# Patient Record
Sex: FEMALE | Race: WHITE | Employment: FULL TIME | URBAN - METROPOLITAN AREA
[De-identification: names, ages, dates, MRNs, and addresses within clinical notes are randomized per-mention and may not be internally consistent; named-entity substitution may affect disease eponyms.]

---

## 2017-10-12 ENCOUNTER — APPOINTMENT (OUTPATIENT)
Dept: LAB | Facility: HOSPITAL | Age: 57
End: 2017-10-12
Attending: INTERNAL MEDICINE
Payer: COMMERCIAL

## 2017-10-12 DIAGNOSIS — R19.7 DIARRHEA OF PRESUMED INFECTIOUS ORIGIN: ICD-10-CM

## 2017-10-12 PROCEDURE — 87077 CULTURE AEROBIC IDENTIFY: CPT

## 2017-10-12 PROCEDURE — 87046 STOOL CULTR AEROBIC BACT EA: CPT

## 2017-10-12 PROCEDURE — 87045 FECES CULTURE AEROBIC BACT: CPT

## 2020-02-21 ENCOUNTER — WALK IN (OUTPATIENT)
Dept: URGENT CARE | Age: 60
End: 2020-02-21

## 2020-02-21 VITALS
OXYGEN SATURATION: 98 % | HEIGHT: 62 IN | TEMPERATURE: 97.9 F | BODY MASS INDEX: 20.98 KG/M2 | WEIGHT: 114 LBS | DIASTOLIC BLOOD PRESSURE: 68 MMHG | SYSTOLIC BLOOD PRESSURE: 122 MMHG | HEART RATE: 68 BPM

## 2020-02-21 DIAGNOSIS — J02.9 ACUTE PHARYNGITIS, UNSPECIFIED ETIOLOGY: Primary | ICD-10-CM

## 2020-02-21 LAB
INTERNAL PROCEDURAL CONTROLS ACCEPTABLE: NO
S PYO AG THROAT QL IA.RAPID: NEGATIVE

## 2020-02-21 PROCEDURE — 99203 OFFICE O/P NEW LOW 30 MIN: CPT | Performed by: NURSE PRACTITIONER

## 2020-02-21 PROCEDURE — 87880 STREP A ASSAY W/OPTIC: CPT | Performed by: NURSE PRACTITIONER

## 2020-02-21 RX ORDER — LEVOTHYROXINE SODIUM 0.1 MG/1
100 TABLET ORAL DAILY
COMMUNITY

## 2020-02-21 RX ORDER — NIFEDIPINE 30 MG/1
30 TABLET, EXTENDED RELEASE ORAL DAILY
COMMUNITY

## 2020-02-21 SDOH — HEALTH STABILITY: MENTAL HEALTH: HOW OFTEN DO YOU HAVE A DRINK CONTAINING ALCOHOL?: NEVER

## 2020-02-21 ASSESSMENT — ENCOUNTER SYMPTOMS
VOMITING: 0
COUGH: 0
DIAPHORESIS: 0
SINUS PAIN: 0
RESPIRATORY NEGATIVE: 1
ACTIVITY CHANGE: 0
FEVER: 0
SINUS PRESSURE: 0
HEADACHES: 0
GASTROINTESTINAL NEGATIVE: 1
APPETITE CHANGE: 0
CHILLS: 0
NAUSEA: 0
TROUBLE SWALLOWING: 1
FATIGUE: 0
RHINORRHEA: 0
ABDOMINAL PAIN: 0
EYES NEGATIVE: 1
VOICE CHANGE: 0

## 2023-03-24 ENCOUNTER — LAB ENCOUNTER (OUTPATIENT)
Dept: LAB | Age: 63
End: 2023-03-24
Attending: SPECIALIST
Payer: COMMERCIAL

## 2023-03-24 ENCOUNTER — OFFICE VISIT (OUTPATIENT)
Dept: OTOLARYNGOLOGY | Facility: CLINIC | Age: 63
End: 2023-03-24

## 2023-03-24 VITALS — WEIGHT: 110 LBS | BODY MASS INDEX: 20.24 KG/M2 | HEIGHT: 62 IN

## 2023-03-24 DIAGNOSIS — K14.8 TONGUE LESION: Primary | ICD-10-CM

## 2023-03-24 DIAGNOSIS — R59.1 HEAD AND NECK LYMPHADENOPATHY: ICD-10-CM

## 2023-03-24 DIAGNOSIS — K14.8 TONGUE LESION: ICD-10-CM

## 2023-03-24 PROCEDURE — 41100 BIOPSY OF TONGUE: CPT | Performed by: SPECIALIST

## 2023-03-24 PROCEDURE — 88346 IMFLUOR 1ST 1ANTB STAIN PX: CPT

## 2023-03-24 PROCEDURE — 83516 IMMUNOASSAY NONANTIBODY: CPT

## 2023-03-24 PROCEDURE — 3008F BODY MASS INDEX DOCD: CPT | Performed by: SPECIALIST

## 2023-03-24 PROCEDURE — 88350 IMFLUOR EA ADDL 1ANTB STN PX: CPT

## 2023-03-24 PROCEDURE — 99203 OFFICE O/P NEW LOW 30 MIN: CPT | Performed by: SPECIALIST

## 2023-03-24 RX ORDER — AMOXICILLIN 875 MG/1
875 TABLET, COATED ORAL 2 TIMES DAILY
Qty: 20 TABLET | Refills: 0 | Status: SHIPPED | OUTPATIENT
Start: 2023-03-24

## 2023-03-24 NOTE — PATIENT INSTRUCTIONS
There is a 2 x 1.4 cm inflamed area of tongue on the left which was biopsied. I placed on a trial of prednisone 20 mg for 3 days and amoxicillin. I will of course notify you of the results.

## 2023-06-28 ENCOUNTER — OFFICE VISIT (OUTPATIENT)
Dept: OTOLARYNGOLOGY | Facility: CLINIC | Age: 63
End: 2023-06-28

## 2023-06-28 VITALS — HEIGHT: 62 IN | WEIGHT: 110 LBS | BODY MASS INDEX: 20.24 KG/M2

## 2023-06-28 DIAGNOSIS — K14.8 TONGUE LESION: Primary | ICD-10-CM

## 2023-06-28 PROCEDURE — 3008F BODY MASS INDEX DOCD: CPT | Performed by: SPECIALIST

## 2023-06-28 PROCEDURE — 99212 OFFICE O/P EST SF 10 MIN: CPT | Performed by: SPECIALIST

## 2024-03-04 ENCOUNTER — TELEPHONE (OUTPATIENT)
Dept: ORTHOPEDICS CLINIC | Facility: CLINIC | Age: 64
End: 2024-03-04

## 2024-03-04 DIAGNOSIS — M43.12 SPONDYLOLISTHESIS OF CERVICAL REGION: Primary | ICD-10-CM

## 2024-03-07 ENCOUNTER — TELEPHONE (OUTPATIENT)
Dept: CASE MANAGEMENT | Age: 64
End: 2024-03-07

## 2024-03-07 NOTE — TELEPHONE ENCOUNTER
Clinical documentation is required from the health plan to approve this request. Case pending submission. Please advise.

## 2024-03-12 ENCOUNTER — OFFICE VISIT (OUTPATIENT)
Dept: SURGERY | Facility: CLINIC | Age: 64
End: 2024-03-12
Payer: COMMERCIAL

## 2024-03-12 ENCOUNTER — HOSPITAL ENCOUNTER (OUTPATIENT)
Dept: GENERAL RADIOLOGY | Facility: HOSPITAL | Age: 64
Discharge: HOME | End: 2024-03-12
Attending: ORTHOPAEDIC SURGERY
Payer: COMMERCIAL

## 2024-03-12 ENCOUNTER — TELEPHONE (OUTPATIENT)
Dept: SURGERY | Facility: CLINIC | Age: 64
End: 2024-03-12

## 2024-03-12 ENCOUNTER — HOSPITAL ENCOUNTER (OUTPATIENT)
Dept: CT IMAGING | Facility: HOSPITAL | Age: 64
Discharge: HOME | End: 2024-03-12
Attending: ORTHOPAEDIC SURGERY
Payer: COMMERCIAL

## 2024-03-12 VITALS
SYSTOLIC BLOOD PRESSURE: 150 MMHG | HEIGHT: 62 IN | WEIGHT: 110 LBS | HEART RATE: 77 BPM | DIASTOLIC BLOOD PRESSURE: 84 MMHG | BODY MASS INDEX: 20.24 KG/M2

## 2024-03-12 DIAGNOSIS — M43.12 SPONDYLOLISTHESIS OF CERVICAL REGION: ICD-10-CM

## 2024-03-12 DIAGNOSIS — M48.02 CERVICAL STENOSIS OF SPINAL CANAL: ICD-10-CM

## 2024-03-12 DIAGNOSIS — M43.12 ACQUIRED SPONDYLOLISTHESIS OF CERVICAL VERTEBRA: Primary | ICD-10-CM

## 2024-03-12 PROCEDURE — 3008F BODY MASS INDEX DOCD: CPT | Performed by: NEUROLOGICAL SURGERY

## 2024-03-12 PROCEDURE — 3077F SYST BP >= 140 MM HG: CPT | Performed by: NEUROLOGICAL SURGERY

## 2024-03-12 PROCEDURE — 99245 OFF/OP CONSLTJ NEW/EST HI 55: CPT | Performed by: NEUROLOGICAL SURGERY

## 2024-03-12 PROCEDURE — 72052 X-RAY EXAM NECK SPINE 6/>VWS: CPT | Performed by: ORTHOPAEDIC SURGERY

## 2024-03-12 PROCEDURE — 3079F DIAST BP 80-89 MM HG: CPT | Performed by: NEUROLOGICAL SURGERY

## 2024-03-12 PROCEDURE — 72125 CT NECK SPINE W/O DYE: CPT | Performed by: ORTHOPAEDIC SURGERY

## 2024-03-12 RX ORDER — NIFEDIPINE 30 MG/1
TABLET, EXTENDED RELEASE ORAL
COMMUNITY
Start: 2023-03-26

## 2024-03-12 RX ORDER — ESTRADIOL 10 UG/1
INSERT VAGINAL
COMMUNITY
Start: 2023-12-31

## 2024-03-12 RX ORDER — MULTIVITAMIN
TABLET ORAL AS DIRECTED
COMMUNITY

## 2024-03-12 RX ORDER — LEVOTHYROXINE SODIUM 0.07 MG/1
TABLET ORAL
COMMUNITY
Start: 2023-03-26

## 2024-03-12 RX ORDER — CLONAZEPAM 1 MG/1
1 TABLET ORAL NIGHTLY
COMMUNITY
Start: 2023-12-07

## 2024-03-12 RX ORDER — ATORVASTATIN CALCIUM 20 MG/1
20 TABLET, FILM COATED ORAL DAILY
COMMUNITY
Start: 2023-12-12

## 2024-03-12 NOTE — TELEPHONE ENCOUNTER
Patient brought imaging from [facility] to her appointment with Dr. Michael.    03/01/2024 - MRI Cervical Spine  03/08/2022 - MRI Cervical Spine    03/04/2024 - MRI Lumbar Spine  04/22/2022 - MRI Lumbar Spine     Imaging uploaded to PACS.  Copies of report sent to scanning department.   Imaging disc and original reports returned to patient.

## 2024-03-12 NOTE — PROGRESS NOTES
New left handed patient states she has C4-5 spondylolisthesis. Patient has neck tightness and trapezius muscles. Patient feels that her head is to heavy for her neck. Patient states that proximal scapula pain. Right hand numbness that she describes as 1/1 and it is intermittent.

## 2024-03-12 NOTE — H&P
AZALEA CUMMINGS M.D., F.A.A.N.S.     of Neurosurgery  [facility]  Board Certified Neurosurgeon                              [facility]        [place] for [facility]      [address]  [address]  [place] [address]    PHONE  [phone]          FAX  [phone]    [email or web address]    [facility], [facility], [place]     OFFICE CONSULTATION          Damaris Ratliff  : 1960   MRN: [id number]    PCP: CHELY VALENTINE  Referring Provider: No ref. provider found     Insurance: Payor: [facility] PPO / Plan: BCBS PPO / Product Type: PPO /            Date of Consult:  3/12/2024    Reason for Consultation:  Our patient has been referred to our office for evaluation of: Neck pain      History of Present Illness:  Damaris Ratliff is a a(n) left-handed, 63 year old, female.  This is a pleasant female patient with history of non-Hodgkin's lymphoma and radiation to the neck presents with a history of worsening neck pain and heaviness of the head.  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  She denies any apraxia and she denies any upper extremity weakness.  Furthermore, there is no history of any balance issues.  There is no acute bowel or bladder issues.        History:  Past Medical History:   Diagnosis Date    Essential hypertension     Hyperthyroidism       No past surgical history on file.  No family history on file.   Social History     Socioeconomic History    Marital status:      Spouse name: Not on file    Number of children: Not on file    Years of education: Not on file    Highest education level: Not on file   Occupational History    Not on file   Tobacco Use    Smoking status: Never    Smokeless tobacco: Never   Vaping Use    Vaping Use: Never used   Substance and Sexual Activity    Alcohol use: Yes    Drug use: Never    Sexual  activity: Not on file   Other Topics Concern    Not on file   Social History Narrative    Not on file     Social Determinants of Health     Financial Resource Strain: Not on file   Food Insecurity: Not on file   Transportation Needs: Not on file   Physical Activity: Not on file   Stress: Not on file   Social Connections: Not on file   Housing Stability: Not on file        Allergies:  Not on File    Medications:  Current Outpatient Medications   Medication Sig Dispense Refill    NIFEdipine ER 30 MG Oral Tablet 24 Hr       levothyroxine 75 MCG Oral Tab       Estradiol 10 MCG Vaginal Tab       clonazePAM 1 MG Oral Tab Take 1 tablet (1 mg total) by mouth nightly.      atorvastatin 20 MG Oral Tab Take 1 tablet (20 mg total) by mouth daily.      Multiple Vitamin (DAILY VITES) Oral Tab Take by mouth As Directed.      amoxicillin 875 MG Oral Tab Take 1 tablet (875 mg total) by mouth 2 (two) times daily. 20 tablet 0        Review of Systems:  A 10-point system was reviewed.  Pertinent positives and negatives are noted in HPI.      Physical Exam:  /84 (BP Location: Left arm, Patient Position: Sitting, Cuff Size: adult)   Pulse 77   Ht 62\"   Wt 110 lb (49.9 kg)   BMI 20.12 kg/m²        Neurological Exam:    Motor Strength:  5/5 AMG and symmetric    Sensation to light touch:  Intact and symmetric in upper extremities    DTRs:  2+/4 in upper extremities    Long tract signs:  Negative hill  Negative babinski  Negative clonus      Abdomen:  Soft, non-distended, non-tender, with no rebound or guarding.  No peritoneal signs.     Extremities:  Non-tender, no lower extremity edema noted.      Labs:  CBC:  No results found for: \"WBC\", \"HGB\", \"HEMOGLOBIN\", \"HCT\", \"MCV\", \"PLT\"   BMG:   No results found for: \"GLUF\", \"NA\", \"K\", \"CO2\", \"CL\", \"BUN\", \"CREATININE\"   INR:   No results found for: \"INR\", \"PROTIME\"     Diagnostics:  I reviewed an MRI of the cervical spine.  There is grade 2 spondylolisthesis at C4-5 with displacement  of the cervical spinal cord.  There is severe stenosis at C4-5 and moderate narrowing at the lower levels, at C5-6 and C6-7.    I reviewed a CT of the cervical spine.  There is evidence of grade 2 spondylolisthesis at C4-5 with dysplastic elongation of the posterior lateral masses.  The lateral masses are dysplastic also at C6.    I reviewed flexion and extension x-rays of the cervical spine with evidence of  Mobile spondylolisthesis at C4-5 which appears to reduce on extension and certainly worsens on forward flexion.     Diagnosis:  1. Acquired spondylolisthesis of cervical vertebra    2. Cervical stenosis of spinal canal        Assessment/Plan:  Damaris Ratliff is a a(n) 63 year old, female, presents with a symptomatic mobile spondylolisthesis, grade 2, at C4-5.  I reviewed the imaging modalities and discussed with the patient and her  that she would be a candidate for stabilization of the spondylolisthesis.  Reduction of the slippage will also lead to decompression of the spinal cord.  She is not objectively myelopathic at this point in time but certainly at a significant danger for spinal cord compression without addressing this pathology appropriately.  I do not believe that there is enough posterior element bone mass to obtain a good fixation posteriorly and my preference would be to address this issue anteriorly only.  The plan would be to attempt a C4-5 ACDF with the potential of performing a C5 corpectomy and C4-6 anterior fusion in case the reduction is not successful.  I discussed the indications and details of the procedure.  We reviewed benefits and risks as well.  We will be scheduling the surgery for 10 April pending preoperative clearance.    All questions and concerns were addressed. We appreciate the opportunity to participate in the care of this patient. Please do not hesitate to call our office ([phone]) with any issues.   This report will be submitted to the referring  provider.          Emigdio Michael M.D., F.A.A.N.S.    3/12/2024  4:01 PM    This note has been dictated utilizing voice recognition software. Unfortunately, this may lead to occasional typographical errors. If there are any questions regarding this, please do not hesitate to contact our office.

## 2024-03-12 NOTE — H&P (VIEW-ONLY)
AZALEA CUMMINGS M.D., F.A.A.N.S.     of Neurosurgery  [facility]  Board Certified Neurosurgeon                              [facility]        [place] for [facility]      [address]  [address]  [place] [address]    PHONE  [phone]          FAX  [phone]    [email or web address]    [place], [facility], [place]     OFFICE CONSULTATION          Damaris Ratliff  : 1960   MRN: [id number]    PCP: CHELY VALENTINE  Referring Provider: No ref. provider found     Insurance: Payor: [facility] PPO / Plan: BCBS PPO / Product Type: PPO /            Date of Consult:  3/12/2024    Reason for Consultation:  Our patient has been referred to our office for evaluation of: Neck pain      History of Present Illness:  Damaris Ratliff is a a(n) left-handed, 63 year old, female.  This is a pleasant female patient with history of non-Hodgkin's lymphoma and radiation to the neck presents with a history of worsening neck pain and heaviness of the head.  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  She denies any apraxia and she denies any upper extremity weakness.  Furthermore, there is no history of any balance issues.  There is no acute bowel or bladder issues.        History:  Past Medical History:   Diagnosis Date    Essential hypertension     Hyperthyroidism       No past surgical history on file.  No family history on file.   Social History     Socioeconomic History    Marital status:      Spouse name: Not on file    Number of children: Not on file    Years of education: Not on file    Highest education level: Not on file   Occupational History    Not on file   Tobacco Use    Smoking status: Never    Smokeless tobacco: Never   Vaping Use    Vaping Use: Never used   Substance and Sexual Activity    Alcohol use: Yes    Drug use: Never    Sexual  activity: Not on file   Other Topics Concern    Not on file   Social History Narrative    Not on file     Social Determinants of Health     Financial Resource Strain: Not on file   Food Insecurity: Not on file   Transportation Needs: Not on file   Physical Activity: Not on file   Stress: Not on file   Social Connections: Not on file   Housing Stability: Not on file        Allergies:  Not on File    Medications:  Current Outpatient Medications   Medication Sig Dispense Refill    NIFEdipine ER 30 MG Oral Tablet 24 Hr       levothyroxine 75 MCG Oral Tab       Estradiol 10 MCG Vaginal Tab       clonazePAM 1 MG Oral Tab Take 1 tablet (1 mg total) by mouth nightly.      atorvastatin 20 MG Oral Tab Take 1 tablet (20 mg total) by mouth daily.      Multiple Vitamin (DAILY VITES) Oral Tab Take by mouth As Directed.      amoxicillin 875 MG Oral Tab Take 1 tablet (875 mg total) by mouth 2 (two) times daily. 20 tablet 0        Review of Systems:  A 10-point system was reviewed.  Pertinent positives and negatives are noted in HPI.      Physical Exam:  /84 (BP Location: Left arm, Patient Position: Sitting, Cuff Size: adult)   Pulse 77   Ht 62\"   Wt 110 lb (49.9 kg)   BMI 20.12 kg/m²        Neurological Exam:    Motor Strength:  5/5 AMG and symmetric    Sensation to light touch:  Intact and symmetric in upper extremities    DTRs:  2+/4 in upper extremities    Long tract signs:  Negative hill  Negative babinski  Negative clonus      Abdomen:  Soft, non-distended, non-tender, with no rebound or guarding.  No peritoneal signs.     Extremities:  Non-tender, no lower extremity edema noted.      Labs:  CBC:  No results found for: \"WBC\", \"HGB\", \"HEMOGLOBIN\", \"HCT\", \"MCV\", \"PLT\"   BMG:   No results found for: \"GLUF\", \"NA\", \"K\", \"CO2\", \"CL\", \"BUN\", \"CREATININE\"   INR:   No results found for: \"INR\", \"PROTIME\"     Diagnostics:  I reviewed an MRI of the cervical spine.  There is grade 2 spondylolisthesis at C4-5 with displacement  of the cervical spinal cord.  There is severe stenosis at C4-5 and moderate narrowing at the lower levels, at C5-6 and C6-7.    I reviewed a CT of the cervical spine.  There is evidence of grade 2 spondylolisthesis at C4-5 with dysplastic elongation of the posterior lateral masses.  The lateral masses are dysplastic also at C6.    I reviewed flexion and extension x-rays of the cervical spine with evidence of  Mobile spondylolisthesis at C4-5 which appears to reduce on extension and certainly worsens on forward flexion.     Diagnosis:  1. Acquired spondylolisthesis of cervical vertebra    2. Cervical stenosis of spinal canal        Assessment/Plan:  Damaris Ratliff is a a(n) 63 year old, female, presents with a symptomatic mobile spondylolisthesis, grade 2, at C4-5.  I reviewed the imaging modalities and discussed with the patient and her  that she would be a candidate for stabilization of the spondylolisthesis.  Reduction of the slippage will also lead to decompression of the spinal cord.  She is not objectively myelopathic at this point in time but certainly at a significant danger for spinal cord compression without addressing this pathology appropriately.  I do not believe that there is enough posterior element bone mass to obtain a good fixation posteriorly and my preference would be to address this issue anteriorly only.  The plan would be to attempt a C4-5 ACDF with the potential of performing a C5 corpectomy and C4-6 anterior fusion in case the reduction is not successful.  I discussed the indications and details of the procedure.  We reviewed benefits and risks as well.  We will be scheduling the surgery for 10 April pending preoperative clearance.    All questions and concerns were addressed. We appreciate the opportunity to participate in the care of this patient. Please do not hesitate to call our office ([phone]) with any issues.   This report will be submitted to the referring  provider.          Emigdio Michael M.D., F.A.A.N.S.    3/12/2024  4:01 PM    This note has been dictated utilizing voice recognition software. Unfortunately, this may lead to occasional typographical errors. If there are any questions regarding this, please do not hesitate to contact our office.

## 2024-03-13 ENCOUNTER — TELEPHONE (OUTPATIENT)
Dept: SURGERY | Facility: CLINIC | Age: 64
End: 2024-03-13

## 2024-03-13 DIAGNOSIS — M43.12 ACQUIRED SPONDYLOLISTHESIS OF CERVICAL VERTEBRA: Primary | ICD-10-CM

## 2024-03-13 NOTE — TELEPHONE ENCOUNTER
You are scheduled for cervical 4-6 anterior discectomy and fusion, possible cervical 5 corpectomy on 4-10-24 with Dr. Michael at [facility].    Pre-op clearance from your primary care provider is needed within 30 days of surgery.  We have faxed a clearance request to Dr. Thomas 's office.  Please contact their office for appointment.  Please schedule this appointment AT LEAST 1 WEEK PRIOR TO SURGERY DATE.  Your PCP may order additional testing or clearance from another specialist.     You will have an appointment with our RN Spine Navigator prior to surgery.  This is an educational telehealth/phone visit in which you will receive more information on the specifics of your surgery, instructions for before surgery, what to expect in the hospital and how to take care of yourself after surgery.  Your surgeon wants you to to participate in this visit so that you are as prepared for surgery as possible and have an opportunity to ask questions.  If possible, we would like your care partner to be present for the visit as well.       You will need to contact the Pre-admission department at [phone] to schedule your pre-op testing.  They will get you scheduled for blood work and a MRSA test (nasal swab). You will need for fast for the blood work.  You may also need an EKG and/or chest x-ray depending on your current health status.  If they do not answer when you call, please leave a message and they will call you back.  They return calls in order of the date of surgery so it may be a few days before they return your call.      Do not take any blood thinning medications, over the counter non-steroidal anti-inflammatories (NSAIDS such as ibuprofen, advil, aleve etc.), herbal supplements (garlic,tumeric etc.), vitamin E, fish oil or krill oil for at least 7 days prior to surgery.  If you have questions about any of your other medications, please call our office or send a [employer] message.     You should have nothing to eat  or drink after 11:00pm the night prior to surgery EXCEPT GATORADE:  You will need to drink 12 ounces (small bottle) of regular Gatorade (NOT RED) 12 hours and 4 hours prior to your scheduled surgery time. Do not drink any other liquids (including water) before your surgery. Do not chew gum or eat candies before surgery.  Take 1000 mg of Tylenol (Acetaminophen) 4 hours before your scheduled surgery time, take this with your scheduled Gatorade.  You should take your daily medications with the 4 hour Gatorade, unless instructed otherwise.     Surgery is usually scheduled as 2-3 day admission.  This is an estimate and varies from person to person.  Ultimately, the surgeon will determine when you are ready to be discharged.    Our office will contact your insurance for authorization of surgery.       The hospital will contact you 1-2 days before surgery with your expected arrival time and day-of instructions.     To prevent infection post-operatively, you will need to shower with Hibiclens soap for 5 days prior to surgery. The last shower should be the night before surgery.  Hibiclens soap can be found at any pharmacy in the first-aid section.  See detailed instructions at the end of this message.      FMLA/DISABILITY PAPERWORK  If you require FMLA or disability paperwork for your recovery, please make sure to either drop it off or have it faxed to our office at [phone]. Be sure the form has your name and date of birth on it.  The form will be faxed to our Forms Department and they will complete it for you.  There is a 25$ fee for all forms that need to be filled out.  Please be aware there is a 10-14 day turnaround time.  You will need to sign a release of information (LLOYD) form if your paperwork does not come with one.  You may call the Forms Department with any questions at [phone].  Their fax number is [phone].     Hibiclens Bathing  Hibiclens is a body soap that is used before surgery protect you from  getting an infection after surgery   Hibiclens can be found in most pharmacies in the First Aid supplies  Shower with this daily for FIVE consecutive days before surgery, using the entire bottle over the five days.  The last shower should be the night before surgery.   Steps to bathing with Hibiclens  Do not use Hibiclens on your hair, face or private areas  Wash your hair and face as normal with your usual cleansers  Rinse well  Using a clean wet washcloth apply enough Hibiclens to cover your body. Wash from the neck down avoiding the genital areas and concentrating on the surgical area  Rinse well  Dry yourself with a clean, dry towel  Do not use any powders, creams, lotions or sprays on your body as these attract bacteria  Deodorant and facial creams are acceptable.   (Laundering/cleaning: Chlorhexidine gluconate skin cleansers will cause stains if used with chlorine releasing products. Rinse completely and use only non-chlorine detergents.)    POST OP CARE--First Two Weeks  No bending at waist, twisting, pushing or pulling  No lifting more than 10 lb (a gallon of milk)  Wear cervical collar/lumbar brace, if prescribed, as instructed by surgeon  No overhead reaching  For anterior cervical surgeries, begin with eating soft foods and thick liquids for the first few days.  It is normal to have some discomfort when swallowing.  Return to normal diet as tolerated.   No driving until approved by your surgeon   Change positions frequently. No prolonged sitting or standing.  Increase walking as tolerated to avoid blood clots  No NSAIDs until approved by surgeon (ibuprofen, aleve, advil, meloxicam, Celebrex, diclofenac etc).   Remove any bandage on post op day 3.  Leave incision open to air.  Glue will fall off on its own.  If you have staples or sutures that are not dissolvable, these will be removed at your 2 week post op visit.   Check your incision for signs of infection daily (redness, warmth, drainage, increased  pain at incision site, fever)  Do not shower until post op day 3.  Do not allow water pressure to directly hit the incision.  Do not scrub the incision and avoid any lotion, creams or perfume near the incision site.  No swimming, hot tubs or baths until your incision is completely healed  Take pain medication as prescribed, if needed.  Constipation is a common side effect of opioids.  If you experience constipation, drink plenty of water and take over-the-counter stool softeners daily.   Avoid nicotine and alcohol   When to call the office:  Increased or change in location of pain  Increased weakness in arms or legs  Incision drainage, redness or warmth  Fever  Bowel or bladder changes   Choking on food or liquids (cervical)  Pain, redness, swelling, color changes or warmth in lower leg  For Office Use Only:  Medical Clearance Requested:  PCP  PA:DEBBIE  CPT Codes:

## 2024-03-14 ENCOUNTER — TELEPHONE (OUTPATIENT)
Dept: SURGERY | Facility: CLINIC | Age: 64
End: 2024-03-14

## 2024-03-14 DIAGNOSIS — M43.12 ACQUIRED SPONDYLOLISTHESIS OF CERVICAL VERTEBRA: Primary | ICD-10-CM

## 2024-03-14 DIAGNOSIS — M48.02 CERVICAL STENOSIS OF SPINAL CANAL: ICD-10-CM

## 2024-03-18 NOTE — TELEPHONE ENCOUNTER
Patient is scheduled for Cervical 5 Corpectomy and Cervical 4-6 Discectomy and Fusion  on 4-10-24 with Dr Michael at [facility].    N/A pre-op apt scheduled (if sx is more than 30 days from last apt)  Y pre-op apt scheduled with RN spine navigator  Y Surgical instructions reviewed by nursing staff with patient  Y  form completed  Y Surgery order signed   Y Placed sx on surgery sheet  Y Placed on outlook calendar  Y Glasst message sent to patient with sx instructions  Y Faxed pre-op clearance request to PCP Jodi Thomas   N/a Faxed letter to prescribing provider requesting anticoagulants be held for surgery  N/a E-mail sent to [employer]  Y Post-op appointments made  Y MORENITA LEWIS. Routed to PA team to initiate.  Y Post-Op outreach pt reminder placed.   Y Entire Neurosurgery Checklist Completed    Clearances: PCP  PA:DEBBIE  CPT Codes: 28603, 47907, 34147, 75301, 21026, 43647, 94844

## 2024-03-20 NOTE — TELEPHONE ENCOUNTER
Prior Authorization for Inpatient surgery initiated with [employer].  Requesting coverage for:  Cervical 5 Corpectomy and Cervical 4-6 Discectomy and Fusion   Date of Service: 04/10/24   Inpatient days requested: 2 Days   CPT codes: 05487, 37858, 03290, 88264, 92758, 58189, 42014    Request for surgery pending review, pending reference #[id number]. Clinical notes uploaded on [email or web address], confirmation received.

## 2024-03-25 NOTE — TELEPHONE ENCOUNTER
Scheduled P2P for 3-27-24 at 8:30 am with Dr De La Cruz.  Provider will call MORENITA Quinteros at cell# first, and RN desk phone if provider does not answer.      Placed on [place] calendar.  Routed to provider to inform.

## 2024-03-25 NOTE — TELEPHONE ENCOUNTER
Received denial from [facility] for 04/10/24 surgery with Dr. Michael at [facility].      Denial reason:    62522, 05235, 66110, 22186,53519-  We were told that you have neck and arm pain.  We have been asked to approve a surgery to treat your pain.  We reviewed records sent by your doctor.  These records show that a surgery other than the one we were asked to approve is planned.  We have not received clarification of which surgery your doctor wants for you.      20930, 70996-  Your doctor wants to use a special material to help fuse the bones in your spine.  This is used for some patients who are going to have spine surgery.  We reviewed the notes we received.  Your doctor wants you to have surgery to join the bones in your spine.  The notes do not show that your surgery has been authorized.  For this reason, the use of this special material has not been approved.      Please call [facility] at [phone] to schedule a P2P.    To request an appeal please call [phone].  Send fax to:[phone].

## 2024-03-25 NOTE — TELEPHONE ENCOUNTER
Received fax from PCP Dr Thomas stating \"patient is medically cleared for surgery\".      Faxed to PAT.  Copy made and sent to scan.  Copy saved at RN desk until scanning can be confirmed.     P2P needs to be schedule.  Please advise what day/time is good for scheduling.

## 2024-03-27 NOTE — TELEPHONE ENCOUNTER
Prior Authorization for Inpatient surgery initiated with [employer].  Requesting coverage for:Cervical 5 Corpectomy and Cervical 4-6 Discectomy and Fusion   Date of Service: 04/10/24-06/08/24   Inpatient days requested:2 Days  CPT codes: 97371, 59952, 60475, 59521, 11122    Request for surgery pending review, pending reference #[id number]. Clinical notes uploaded.    Per Eleni with [employer] [phone] previous case was denied and closed.  Need to submit a new prior auth on [employer] with only the 5 CPT codes.    Once approved with [employer] will need to get hospital stay approved through [employer].      Called [facility] @[phone] spoke to Michelle SLADE started authorization for hospital stay (2 Days).  Faxed clinicals to:  [phone].  Pending ref#[id number]

## 2024-03-27 NOTE — TELEPHONE ENCOUNTER
Peer to peer was denied.  [employer] states the PA should just be submitted with corpectomy codes and no ACDF codes.       Please submit new prior auth with the following CPT codes:  77569, 62000, 64577, 10538, 70423 only.

## 2024-03-28 ENCOUNTER — LAB ENCOUNTER (OUTPATIENT)
Dept: LAB | Facility: HOSPITAL | Age: 64
End: 2024-03-28
Attending: INTERNAL MEDICINE
Payer: COMMERCIAL

## 2024-03-28 DIAGNOSIS — Z01.812 PRE-OPERATIVE LABORATORY EXAMINATION: Primary | ICD-10-CM

## 2024-03-28 DIAGNOSIS — E78.5 HYPERLIPIDEMIA: ICD-10-CM

## 2024-03-28 LAB
ALBUMIN SERPL-MCNC: 4.4 G/DL (ref 3.2–4.8)
ALBUMIN/GLOB SERPL: 1.7 {RATIO} (ref 1–2)
ALP LIVER SERPL-CCNC: 48 U/L
ALT SERPL-CCNC: 32 U/L
ANION GAP SERPL CALC-SCNC: 8 MMOL/L (ref 0–18)
ANTIBODY SCREEN: NEGATIVE
APTT PPP: 31.9 SECONDS (ref 23.3–35.6)
AST SERPL-CCNC: 42 U/L (ref ?–34)
BASOPHILS # BLD AUTO: 0.06 X10(3) UL (ref 0–0.2)
BASOPHILS NFR BLD AUTO: 1.7 %
BILIRUB SERPL-MCNC: 0.3 MG/DL (ref 0.2–1.1)
BUN BLD-MCNC: 26 MG/DL (ref 9–23)
BUN/CREAT SERPL: 25.2 (ref 10–20)
CALCIUM BLD-MCNC: 9.2 MG/DL (ref 8.7–10.4)
CHLORIDE SERPL-SCNC: 106 MMOL/L (ref 98–112)
CHOLEST SERPL-MCNC: 129 MG/DL (ref ?–200)
CO2 SERPL-SCNC: 25 MMOL/L (ref 21–32)
CREAT BLD-MCNC: 1.03 MG/DL
DEPRECATED RDW RBC AUTO: 50.7 FL (ref 35.1–46.3)
EGFRCR SERPLBLD CKD-EPI 2021: 61 ML/MIN/1.73M2 (ref 60–?)
EOSINOPHIL # BLD AUTO: 0.04 X10(3) UL (ref 0–0.7)
EOSINOPHIL NFR BLD AUTO: 1.1 %
ERYTHROCYTE [DISTWIDTH] IN BLOOD BY AUTOMATED COUNT: 14.7 % (ref 11–15)
FASTING PATIENT LIPID ANSWER: YES
FASTING STATUS PATIENT QL REPORTED: YES
GLOBULIN PLAS-MCNC: 2.6 G/DL (ref 2.8–4.4)
GLUCOSE BLD-MCNC: 89 MG/DL (ref 70–99)
HCT VFR BLD AUTO: 37.3 %
HDLC SERPL-MCNC: 62 MG/DL (ref 40–59)
HGB BLD-MCNC: 12.5 G/DL
IMM GRANULOCYTES # BLD AUTO: 0.01 X10(3) UL (ref 0–1)
IMM GRANULOCYTES NFR BLD: 0.3 %
INR BLD: 0.97 (ref 0.8–1.2)
LDLC SERPL CALC-MCNC: 53 MG/DL (ref ?–100)
LYMPHOCYTES # BLD AUTO: 1.36 X10(3) UL (ref 1–4)
LYMPHOCYTES NFR BLD AUTO: 37.7 %
MCH RBC QN AUTO: 31.2 PG (ref 26–34)
MCHC RBC AUTO-ENTMCNC: 33.5 G/DL (ref 31–37)
MCV RBC AUTO: 93 FL
MONOCYTES # BLD AUTO: 0.83 X10(3) UL (ref 0.1–1)
MONOCYTES NFR BLD AUTO: 23 %
MRSA DNA SPEC QL NAA+PROBE: NEGATIVE
NEUTROPHILS # BLD AUTO: 1.31 X10 (3) UL (ref 1.5–7.7)
NEUTROPHILS # BLD AUTO: 1.31 X10(3) UL (ref 1.5–7.7)
NEUTROPHILS NFR BLD AUTO: 36.2 %
NONHDLC SERPL-MCNC: 67 MG/DL (ref ?–130)
OSMOLALITY SERPL CALC.SUM OF ELEC: 292 MOSM/KG (ref 275–295)
PLATELET # BLD AUTO: 264 10(3)UL (ref 150–450)
POTASSIUM SERPL-SCNC: 4 MMOL/L (ref 3.5–5.1)
PROT SERPL-MCNC: 7 G/DL (ref 5.7–8.2)
PROTHROMBIN TIME: 13.5 SECONDS (ref 11.6–14.8)
RBC # BLD AUTO: 4.01 X10(6)UL
RH BLOOD TYPE: POSITIVE
RH BLOOD TYPE: POSITIVE
SODIUM SERPL-SCNC: 139 MMOL/L (ref 136–145)
TRIGL SERPL-MCNC: 67 MG/DL (ref 30–149)
VLDLC SERPL CALC-MCNC: 10 MG/DL (ref 0–30)
WBC # BLD AUTO: 3.6 X10(3) UL (ref 4–11)

## 2024-03-28 PROCEDURE — 86901 BLOOD TYPING SEROLOGIC RH(D): CPT

## 2024-03-28 PROCEDURE — 86850 RBC ANTIBODY SCREEN: CPT

## 2024-03-28 PROCEDURE — 86900 BLOOD TYPING SEROLOGIC ABO: CPT

## 2024-03-28 PROCEDURE — 80061 LIPID PANEL: CPT

## 2024-03-28 PROCEDURE — 80053 COMPREHEN METABOLIC PANEL: CPT

## 2024-03-28 PROCEDURE — 87641 MR-STAPH DNA AMP PROBE: CPT

## 2024-03-28 PROCEDURE — 85730 THROMBOPLASTIN TIME PARTIAL: CPT

## 2024-03-28 PROCEDURE — 36415 COLL VENOUS BLD VENIPUNCTURE: CPT

## 2024-03-28 PROCEDURE — 85610 PROTHROMBIN TIME: CPT

## 2024-03-28 PROCEDURE — 85025 COMPLETE CBC W/AUTO DIFF WBC: CPT

## 2024-04-02 ENCOUNTER — NURSE ONLY (OUTPATIENT)
Dept: SURGERY | Facility: CLINIC | Age: 64
End: 2024-04-02
Payer: COMMERCIAL

## 2024-04-02 DIAGNOSIS — Z71.9 ENCOUNTER FOR EDUCATION: Primary | ICD-10-CM

## 2024-04-02 NOTE — TELEPHONE ENCOUNTER
Prior authorization request completed for: Cervical 5 Corpectomy and Cervical 4-6 Discectomy and Fusion    Authorization #[id number]  Authorization dates: 04/10/24-06/08/24  CPT codes approved: 71357, 66598, 86872, 21591, 65149  Number of visits/dates of service approved: Outpatient   Physician: Fernanda   Location: [place]     Surgery approved through [facility].  Inpatient stay denied with [employer] ref#[id number] instead of doing P2P change surgery to outpatient since it's approved per Elma Hoffman to Daniel BEGUM with [employer] at [phone] nothing needs to be done with [employer] since already approved through [facility].

## 2024-04-02 NOTE — TELEPHONE ENCOUNTER
Received denial for DOS 04/10/24 inpatient surgery at [facility] with Dr. Michael.      Denial Reason:    Based on the clinical information submitted, it has been determined that the health care services could be provided in a more efficient and appropriate setting.  Medical necessity was not established for hospital stay, such as hemodynamic instability (persistent low blood pressure).  You do not seem to have any serious medical condition that needs to be monitored in the hospital.  An alternative setting could include an observation/ambulatory status or outpatient setting.  Routine surgeries can be done as outpatient.  If your medical condition changes at the time of the procedure, or while in observation status after the procedure, a request for inpatient admission can be submitted at that time.      To schedule a P2P review prior to an appeal submission, please call [phone].     To file a formal appeal please call [phone] or fax your appeal request to [phone].

## 2024-04-02 NOTE — PROGRESS NOTES
RN Spine Navigator Education for Damaris     If you have received instructions from your surgeon that are different from those listed below, please follow your physician's instructions.    You are scheduled for a Cervical fusion with Dr. Michael on 4/10.      Patient Surgical Goals: Stabilize neck    Before Your Surgery    Choose a Care Partner  Patient attended spine navigator visit independently.  Care partner is Saravanan. Your care partner(s) should be able to provide transportation to and from the hospital. They should be able to help at home for the first week after discharge, including helping you with meals, medication, and dressing changes.    Clearance Before Surgery  You will need to see your primary care provider within 30 days before surgery. Please make sure this appointment is at least a week before surgery as more testing or doctor visits may be ordered. Presurgical testing may include labs, nasal swab, imaging, EKG.   Make sure that you complete all preadmission testing so that surgery does not get delayed.    Home Environment  Assessed home status: home has stairs, bathroom and bedroom are upstairs, and patient has pets. Suggested arrangements for pet care.  It is recommended that you prepare your home by putting clean sheets on your bed, freezing meals, and putting frequently used items at waist level.   Prevent falls by removing items that could cause you to trip, adding nightlights and adding a nonskid mat in shower.   Assistive devices can be purchased at a medical supply store or online including canes, walkers, toileting devices (commodes, raised toilet seats, toilet paper wiping aid), long handled sponge, shower chair or tub transfer bench, grabber/reacher tool, sock aid, long handled shoehorn, if needed.      Tobacco, Nicotine and Marijuana Use   Not applicable    Medications to Stop   For 7-10 days before surgery do not take any blood thinning medications. This includes non-steroidal  anti-inflammatories or NSAIDs (Advil, Aleve, Motrin, ibuprofen, naproxen, meloxicam, diclofenac, celecoxib, etc.), aspirin (unless told otherwise by your cardiologist or surgeon), herbal supplements and vitamins (garlic, turmeric, vitamin E, fish oil or krill oil, etc.). You may only take Tylenol or prescribed narcotic medication if needed for pain.   Other medications to stop include: none    Leading Up to Day of Surgery  Five days before surgery wash with Hibiclens soap after your regular body soap every day. Do not put use Hibiclens on your face, hair or privates. Your last shower should be the night before surgery.  One business day before surgery, the preadmission testing staff will call you and let you know what time to arrive, where to park and will provide any additional instructions.   After 11pm the day before surgery, do not eat or drink anything (including water, gum, or candies) except for Tylenol and Gatorade.   Drink 12 ounces of regular Gatorade (NOT RED) 12 hours prior to your scheduled surgery time. Drink your second 12 ounces of regular Gatorade and take 1000 mg of Tylenol (acetaminophen) 4 hours before your scheduled surgery time.     Items to Bring to the Hospital   Bring insurance card, ID, advanced directive, or medical power of  paperwork, loose fitting clothing, shoes with a back that can accommodate swollen feet, long phone charging cord.   Do not bring jewelry, valuables, or medications.     In the Hospital     Operative Day and Hospital Stay  In the preoperative area, you will change into a gown, have an IV placed in your hand or arm by the nurse, and sign any consent paperwork that is needed for your procedure. You will speak to the surgeon and anesthesiologist. It is important to tell the doctors and nurses if you have had any significant side effects from pain medications or anesthesia such as a rash or severe nausea.    In the operating room, the anesthesiologist will attach  monitors, give you oxygen through a mask, and give you medicine through the IV to fall asleep. After you are sleeping, the breathing tube will be placed. The surgeon may use additional nerve monitoring during your surgery. This is to make sure that the muscles and nerves in your arms and legs are working normally as he operates. The equipment will be hooked up and removed while you are asleep. You will wake up on the stretcher.     During the surgery, your care partner can sit in the surgical waiting area. There are TV screens in that area that keep them informed of your progress.     In the recovery room, monitors will be attached that check your heart rate, blood pressure and oxygen levels. While you may not remember this part, a nurse is with you and constantly checking on your condition. Medications for pain and nausea will be given if you need them. You may have a dyer catheter to empty your bladder or a drain at your surgical site. Your family is not allowed in the recovery room. When you are ready to leave the recovery room, you will be transported on your stretcher to the inpatient unit accompanied by your family once a room is available.  On the inpatient unit, a team of doctors and advanced practice providers will manage your care. The spine care nurses will check your blood pressure, temperature, heartbeat, breathing, stomach sounds and your incision. They may assess the strength and sensation in your arms and legs. Medications that are given in the hospital include antibiotics, IV fluids, pain medications, muscle relaxers, stool softeners, and your home medications. You may get blood drawn and another x-ray. Physical and occupational therapists may come to your room to teach you how to move around safely after surgery.     Post Op Plan   The average length of hospital stay is one to three days. A  may visit you to help arrange extra care for you once you leave the hospital. Occasionally, it  is recommended that a home health nurse or therapist visit you in your home for a short time. The best place to recover is your own home, but if you need more assistance than home health and your care partner can provide, the  will help you and your family choose other facilities to help you recover your strength.    Preventing surgical complications  It is important to follow all instructions before and after surgery to decrease the risks of surgery and prevent complications.     Blood clots: walk, wear leg compression devices in the hospital, and do ankle pumps at home  Infection: wash with Hibiclens before surgery, wash your hands, don't touch your incision  Pneumonia: take deep breaths and cough and use the breathing exercise (incentive spirometer)   Nausea/vomiting: start with liquids and small meals and do not take pills on an empty stomach  Constipation: drink water and walk frequently    Tell your nurse if you are experiencing nausea, vomiting or constipation as they have medications to help treat these.      At home     Understanding Pain After Surgery  We will do our best to manage your pain after surgery, but it is not possible to be completely pain-free. There will be operative pain in your back or neck. Pain in the arms or legs may be one of the first things to improve. Numbness, weakness, and tingling should improve over time. However, there can be a temporary increase in symptoms in the first few days due to inflammation from surgery.   Pain medications will be prescribed to take home at discharge. The goal of pain medicine after surgery is to make your pain tolerable, not to make you pain free. We encourage you to use the medication prescribed to you after your surgery, but please take the lowest possible dose to manage your pain. Taking high doses of narcotics can cause side effects. Transition to plain Tylenol when your pain improves. You may get more continuous pain relief by  alternating between medications if you have multiple instead of taking them at the same time. Write down when you have taken a medication as it may be difficult to remember after a few doses.    Post operative medication   Tylenol (acetaminophen): take for pain. Do not take more than 3000 mg - 4000 mg in 24 hours because it can damage your liver.   Narcotics: take for moderate to severe pain. Do not drink alcohol or drive while taking narcotics. Some narcotic medications (Norco, Tylenol #3, Percocet, Vicodin) contain Tylenol (acetaminophen). Make sure to not exceed the maximum dose if you are taking additional Tylenol with these medications.  Muscle relaxers: take for muscle cramping. These can cause drowsiness.    NSAIDS (Advil, Aleve, Motrin, ibuprofen, naproxen, meloxicam, diclofenac, celecoxib, etc.) or aspirin: Do not take these unless your physician says it is ok. For a fusion, it may be several months before you can take NSAIDS.  Stool softeners: take to prevent constipation while you are taking narcotic medications. You can get these over the counter at the pharmacy. You may use laxatives, which are stronger, if needed.    If you believe you will need more of medication your surgeon has prescribed to you, request a refill through your pharmacy or through the refill request in [employer] (log in, go to medications, then select refill request) at least two business days before you run out of medication.     Nonpharmacologic pain management   You may use ice on your incision for 20 minutes every hour to help with pain and swelling. Do not place ice directly on your skin. You can use heat to sooth your muscles but avoid placing heat directly on your incision. Make frequent position changes. You can do gentle stretching while avoiding significant bending or twisting. Use deep breathing techniques and distractions such as TV, music, reading, or games.   Additional Recommendations for Anterior Cervical  Surgery  Smoothies or protein shakes may be more comfortable to consume then solid food for the first week after surgery. To help decrease throat swelling, suck on ice chips and drink cold liquids. Cough drops can also be help decrease throat irritation.    Movement restrictions  After surgery, no bending or twisting your neck or back. Do not lift anything over 10lbs (about the weight of a gallon of milk) or lift anything over your shoulders. Avoid pulling or pushing. You may use stairs while holding the handrail.  It is ok to ride in the car but refrain from driving or traveling long distances until cleared to do so by your surgeon. You may not drive while taking narcotics or muscle relaxants. If you have cervical surgery, it may be several weeks before you can drive. , If you had a fracture or fusion surgery, your doctor may give you a brace. Braces are worn for comfort, when up and moving around, or constantly depending on your doctor's order. Wear your brace as instructed.     Post Op Exercise   Walk frequently. Start with walking short distances and increase as you start to feel better. Do ankle pumps (bending at your ankles, bring your feet towards your head then point them towards the ground) 15-20 times every hour when awake to help prevent blood clots.     Your surgeon will let you know at your post operative appointment if you are ready to decrease your movement restrictions and increase your exercise. If you have questions in between appointments about lessening your restrictions, please contact the office.     You and your doctor will discuss how you are feeling as you heal and decide if outpatient physical therapy or a medical fitness referral is needed.    Caring for your incision  Always wash your hands before touching your incision. Your incision will be closed with sutures under the skin and skin glue or Steri-Strips (thin white bandages) on top of the skin. Do not attempt to peal off Skin  glue/Steri-Strips as they will come off on their own. If the incision is closed with sutures or staples on top of the skin, they will be removed at a post op appointment. The incision may be covered with a gauze dressing that can come off after three days. Once the original gauze dressing is removed, we prefer that you leave your incision open to air (without a gauze dressing). If the incision has drainage or is rubbing against your clothes or brace, you may place gauze and medical tape over it. Change the gauze and medical tape daily. Look at your incision daily to check for signs of infection.  You can shower three days after your surgery or sooner if your surgeon allows. We recommend the care partner be present during the first shower for safety. Let soapy water run over the incision, but do not scrub it or spray it directly. Gently pat it dry after with a clean towel. Do not apply any creams or lotions to the incision. Do not soak in a tub, pool, or any body of water until your incision is fully healed.    Signs of Infection   Check your incision daily for swelling, redness, drainage, pus, bad smell, or opening of the incision.     When to Call for Assistance  Call the Neurosurgery Office ([phone] Option #2) if you experience signs of infection, opening of the incision, continuous nausea or vomiting, poor pain control despite using the pain medication as directed, a sudden increase in pain, temperature over 101F, difficulty swallowing, leg swelling, or with any concerns, unanswered questions, or new problems, such as new numbness/weakness/tingling.  Call 911 or go to the nearest emergency room if you experience chest pain, difficulty breathing, loss of bowel or bladder control, extreme drowsiness, or any other life-threatening situation.     Follow-up Plan   Appointments With Dr. Michael or Kole at 2 and 6 weeks     Answered questions regarding: Talha      You can contact the RN Spine Navigator at  [phone] or [email or web address] with additional questions or feedback on your care. It may take several business days to receive a reply so please do not call the RN spine navigator for refills or for emergencies.    Spine navigator spent 37 minutes conducting a virtual visit to provide education. Thank you for letting the RN Spine Navigator participate in your care.

## 2024-04-05 RX ORDER — AMLODIPINE BESYLATE 10 MG/1
10 TABLET ORAL DAILY
COMMUNITY
Start: 2024-03-25

## 2024-04-08 NOTE — TELEPHONE ENCOUNTER
Nuvasive Osteocel denied by insurance.  Case change request sent informing OR to use alternative product for allograft.

## 2024-04-09 ENCOUNTER — ANESTHESIA EVENT (OUTPATIENT)
Dept: SURGERY | Facility: HOSPITAL | Age: 64
End: 2024-04-09
Payer: COMMERCIAL

## 2024-04-09 NOTE — ANESTHESIA PREPROCEDURE EVALUATION
Anesthesia PreOp Note    HPI:     Damaris Ratliff is a 63 year old female who presents for preoperative consultation requested by: Emigdio Micahel MD    Date of Surgery: 4/10/2024    Procedure(s):  Cervical 5 corpectomy and cervical 4-6 discectomy and fusion  Indication: Acquired spondylolisthesis of cervical vertebra [M43.12]  Cervical stenosis of spinal canal [M48.02]    Relevant Problems   No relevant active problems       NPO:                         History Review:  There are no problems to display for this patient.      Past Medical History:   Diagnosis Date    Anxiety state     Back problem     Cancer (HCC)     Hodgkins lymphoma    Essential hypertension     Exposure to medical diagnostic radiation     High blood pressure     High cholesterol     Hyperthyroidism     Osteoarthritis        Past Surgical History:   Procedure Laterality Date          x 3    OTHER SURGICAL HISTORY      bowel obstruction    REMOVAL SPLEEN, TOTAL      expl. lap       No medications prior to admission.     No current Epic-ordered facility-administered medications on file.     Current Outpatient Medications Ordered in Epic   Medication Sig Dispense Refill    amLODIPine 10 MG Oral Tab Take 1 tablet (10 mg total) by mouth daily.      Coenzyme Q10 (CO Q-10 OR) Take 1 tablet by mouth daily.      Multiple Vitamin (DAILY VITES) Oral Tab Take by mouth As Directed.      levothyroxine 75 MCG Oral Tab       Estradiol 10 MCG Vaginal Tab       clonazePAM 1 MG Oral Tab Take 1 tablet (1 mg total) by mouth nightly.      atorvastatin 20 MG Oral Tab Take 0.5 tablets (10 mg total) by mouth daily.         No Known Allergies    Family History   Problem Relation Age of Onset    Cancer Father     No Known Problems Mother     Other (Other) Sister      Social History     Socioeconomic History    Marital status:    Tobacco Use    Smoking status: Never    Smokeless tobacco: Never   Vaping Use    Vaping Use: Never used   Substance and Sexual  Activity    Alcohol use: Yes     Comment: 1 daily    Drug use: Never       Available pre-op labs reviewed.  Lab Results   Component Value Date    WBC 3.6 (L) 03/28/2024    RBC 4.01 03/28/2024    HGB 12.5 03/28/2024    HCT 37.3 03/28/2024    MCV 93.0 03/28/2024    MCH 31.2 03/28/2024    MCHC 33.5 03/28/2024    RDW 14.7 03/28/2024    .0 03/28/2024     Lab Results   Component Value Date     03/28/2024    K 4.0 03/28/2024     03/28/2024    CO2 25.0 03/28/2024    BUN 26 (H) 03/28/2024    CREATSERUM 1.03 (H) 03/28/2024    GLU 89 03/28/2024    CA 9.2 03/28/2024     Lab Results   Component Value Date    INR 0.97 03/28/2024       Vital Signs:  Body mass index is 19.94 kg/m².   height is 1.575 m (5' 2\") and weight is 49.4 kg (109 lb).   Vitals:    04/05/24 0947   Weight: 49.4 kg (109 lb)   Height: 1.575 m (5' 2\")        Anesthesia Evaluation     Patient summary reviewed and Nursing notes reviewed    Airway   Mallampati: I  TM distance: >3 FB  Neck ROM: full  Dental - Dentition appears grossly intact     Pulmonary - negative ROS and normal exam   Cardiovascular - normal exam  (+) hypertension    Neuro/Psych - negative ROS     GI/Hepatic/Renal - negative ROS     Endo/Other - negative ROS   Abdominal  - normal exam                 Anesthesia Plan:   ASA:  2  Plan:   General  Airway:  ETT and Video laryngoscope  Post-op Pain Management: IV analgesics  Informed Consent Plan and Risks Discussed With:  Patient  Use of Blood Products Discussed With:  Patient      I have informed Damaris Ratliff and/or legal guardian or family member of the nature of the anesthetic plan, benefits, risks including possible dental damage if relevant, major complications, and any alternative forms of anesthetic management.   All of the patient's questions were answered to the best of my ability. The patient desires the anesthetic management as planned.  LORNA SHARP MD  4/9/2024 4:29 PM  Present on Admission:  **None**

## 2024-04-10 ENCOUNTER — HOSPITAL ENCOUNTER (OUTPATIENT)
Facility: HOSPITAL | Age: 64
Discharge: HOME | End: 2024-04-11
Attending: NEUROLOGICAL SURGERY | Admitting: NEUROLOGICAL SURGERY
Payer: COMMERCIAL

## 2024-04-10 ENCOUNTER — ANESTHESIA (OUTPATIENT)
Dept: SURGERY | Facility: HOSPITAL | Age: 64
End: 2024-04-10
Payer: COMMERCIAL

## 2024-04-10 ENCOUNTER — APPOINTMENT (OUTPATIENT)
Dept: GENERAL RADIOLOGY | Facility: HOSPITAL | Age: 64
End: 2024-04-10
Attending: NEUROLOGICAL SURGERY
Payer: COMMERCIAL

## 2024-04-10 DIAGNOSIS — Z98.1 S/P CERVICAL SPINAL FUSION: Primary | ICD-10-CM

## 2024-04-10 PROBLEM — E78.5 HYPERLIPIDEMIA: Status: ACTIVE | Noted: 2024-04-10

## 2024-04-10 PROBLEM — E03.9 HYPOTHYROIDISM: Status: ACTIVE | Noted: 2024-04-10

## 2024-04-10 PROBLEM — I10 ESSENTIAL HYPERTENSION: Status: ACTIVE | Noted: 2024-04-10

## 2024-04-10 PROBLEM — M48.02 CERVICAL SPINAL STENOSIS: Status: ACTIVE | Noted: 2024-04-10

## 2024-04-10 PROCEDURE — 99222 1ST HOSP IP/OBS MODERATE 55: CPT | Performed by: HOSPITALIST

## 2024-04-10 PROCEDURE — 0RB30ZZ EXCISION OF CERVICAL VERTEBRAL DISC, OPEN APPROACH: ICD-10-PCS | Performed by: NEUROLOGICAL SURGERY

## 2024-04-10 PROCEDURE — 0RG20A0 FUSION OF 2 OR MORE CERVICAL VERTEBRAL JOINTS WITH INTERBODY FUSION DEVICE, ANTERIOR APPROACH, ANTERIOR COLUMN, OPEN APPROACH: ICD-10-PCS | Performed by: NEUROLOGICAL SURGERY

## 2024-04-10 PROCEDURE — 76000 FLUOROSCOPY <1 HR PHYS/QHP: CPT | Performed by: NEUROLOGICAL SURGERY

## 2024-04-10 DEVICE — IMPLANTABLE DEVICE: Type: IMPLANTABLE DEVICE | Site: SPINE CERVICAL | Status: FUNCTIONAL

## 2024-04-10 DEVICE — SCREW SPNL 3.5X15MM ANT CERV TI ALLY ST: Type: IMPLANTABLE DEVICE | Site: SPINE CERVICAL | Status: FUNCTIONAL

## 2024-04-10 DEVICE — ATTRAX® PUTTY, 1CC US
Type: IMPLANTABLE DEVICE | Site: SPINE CERVICAL | Status: FUNCTIONAL
Brand: ATTRAX

## 2024-04-10 RX ORDER — DIAZEPAM 5 MG/ML
2.5 INJECTION, SOLUTION INTRAMUSCULAR; INTRAVENOUS ONCE
Status: COMPLETED | OUTPATIENT
Start: 2024-04-10 | End: 2024-04-10

## 2024-04-10 RX ORDER — HYDROMORPHONE HYDROCHLORIDE 1 MG/ML
0.4 INJECTION, SOLUTION INTRAMUSCULAR; INTRAVENOUS; SUBCUTANEOUS EVERY 2 HOUR PRN
Status: DISCONTINUED | OUTPATIENT
Start: 2024-04-10 | End: 2024-04-11

## 2024-04-10 RX ORDER — SODIUM CHLORIDE, SODIUM LACTATE, POTASSIUM CHLORIDE, CALCIUM CHLORIDE 600; 310; 30; 20 MG/100ML; MG/100ML; MG/100ML; MG/100ML
INJECTION, SOLUTION INTRAVENOUS CONTINUOUS
Status: DISCONTINUED | OUTPATIENT
Start: 2024-04-10 | End: 2024-04-10 | Stop reason: HOSPADM

## 2024-04-10 RX ORDER — ACETAMINOPHEN 500 MG
1000 TABLET ORAL ONCE AS NEEDED
Status: DISCONTINUED | OUTPATIENT
Start: 2024-04-10 | End: 2024-04-10 | Stop reason: HOSPADM

## 2024-04-10 RX ORDER — ONDANSETRON 2 MG/ML
4 INJECTION INTRAMUSCULAR; INTRAVENOUS EVERY 6 HOURS PRN
Status: DISCONTINUED | OUTPATIENT
Start: 2024-04-10 | End: 2024-04-11

## 2024-04-10 RX ORDER — MORPHINE SULFATE 4 MG/ML
4 INJECTION, SOLUTION INTRAMUSCULAR; INTRAVENOUS EVERY 10 MIN PRN
Status: DISCONTINUED | OUTPATIENT
Start: 2024-04-10 | End: 2024-04-10 | Stop reason: HOSPADM

## 2024-04-10 RX ORDER — HYDROMORPHONE HYDROCHLORIDE 1 MG/ML
0.2 INJECTION, SOLUTION INTRAMUSCULAR; INTRAVENOUS; SUBCUTANEOUS EVERY 5 MIN PRN
Status: DISCONTINUED | OUTPATIENT
Start: 2024-04-10 | End: 2024-04-10 | Stop reason: HOSPADM

## 2024-04-10 RX ORDER — PROCHLORPERAZINE EDISYLATE 5 MG/ML
5 INJECTION INTRAMUSCULAR; INTRAVENOUS EVERY 8 HOURS PRN
Status: DISCONTINUED | OUTPATIENT
Start: 2024-04-10 | End: 2024-04-10 | Stop reason: HOSPADM

## 2024-04-10 RX ORDER — DIPHENHYDRAMINE HCL 25 MG
25 CAPSULE ORAL EVERY 4 HOURS PRN
Status: DISCONTINUED | OUTPATIENT
Start: 2024-04-10 | End: 2024-04-11

## 2024-04-10 RX ORDER — METHOCARBAMOL 500 MG/1
500 TABLET, FILM COATED ORAL 3 TIMES DAILY
Qty: 30 TABLET | Refills: 0 | Status: SHIPPED | OUTPATIENT
Start: 2024-04-10 | End: 2024-04-20

## 2024-04-10 RX ORDER — CEFAZOLIN SODIUM/WATER 2 G/20 ML
2 SYRINGE (ML) INTRAVENOUS ONCE
Status: COMPLETED | OUTPATIENT
Start: 2024-04-10 | End: 2024-04-10

## 2024-04-10 RX ORDER — PROCHLORPERAZINE EDISYLATE 5 MG/ML
5 INJECTION INTRAMUSCULAR; INTRAVENOUS EVERY 8 HOURS PRN
Status: DISCONTINUED | OUTPATIENT
Start: 2024-04-10 | End: 2024-04-11

## 2024-04-10 RX ORDER — AMLODIPINE BESYLATE 10 MG/1
10 TABLET ORAL DAILY
Status: DISCONTINUED | OUTPATIENT
Start: 2024-04-11 | End: 2024-04-11

## 2024-04-10 RX ORDER — DOCUSATE SODIUM 100 MG/1
100 CAPSULE, LIQUID FILLED ORAL 2 TIMES DAILY
Status: DISCONTINUED | OUTPATIENT
Start: 2024-04-10 | End: 2024-04-11

## 2024-04-10 RX ORDER — OXYCODONE HYDROCHLORIDE 5 MG/1
5 TABLET ORAL EVERY 4 HOURS PRN
Status: DISCONTINUED | OUTPATIENT
Start: 2024-04-10 | End: 2024-04-11

## 2024-04-10 RX ORDER — BUPIVACAINE HYDROCHLORIDE AND EPINEPHRINE 2.5; 5 MG/ML; UG/ML
INJECTION, SOLUTION INFILTRATION; PERINEURAL AS NEEDED
Status: DISCONTINUED | OUTPATIENT
Start: 2024-04-10 | End: 2024-04-10 | Stop reason: HOSPADM

## 2024-04-10 RX ORDER — DEXAMETHASONE SODIUM PHOSPHATE 4 MG/ML
VIAL (ML) INJECTION AS NEEDED
Status: DISCONTINUED | OUTPATIENT
Start: 2024-04-10 | End: 2024-04-10 | Stop reason: SURG

## 2024-04-10 RX ORDER — BISACODYL 10 MG
10 SUPPOSITORY, RECTAL RECTAL
Status: DISCONTINUED | OUTPATIENT
Start: 2024-04-10 | End: 2024-04-11

## 2024-04-10 RX ORDER — HYDROCODONE BITARTRATE AND ACETAMINOPHEN 5; 325 MG/1; MG/1
1 TABLET ORAL EVERY 6 HOURS PRN
Qty: 28 TABLET | Refills: 0 | Status: SHIPPED | OUTPATIENT
Start: 2024-04-10 | End: 2024-04-17

## 2024-04-10 RX ORDER — MIDAZOLAM HYDROCHLORIDE 1 MG/ML
INJECTION INTRAMUSCULAR; INTRAVENOUS AS NEEDED
Status: DISCONTINUED | OUTPATIENT
Start: 2024-04-10 | End: 2024-04-10 | Stop reason: SURG

## 2024-04-10 RX ORDER — MORPHINE SULFATE 10 MG/ML
6 INJECTION, SOLUTION INTRAMUSCULAR; INTRAVENOUS EVERY 10 MIN PRN
Status: DISCONTINUED | OUTPATIENT
Start: 2024-04-10 | End: 2024-04-10 | Stop reason: HOSPADM

## 2024-04-10 RX ORDER — CEFAZOLIN SODIUM/WATER 2 G/20 ML
2 SYRINGE (ML) INTRAVENOUS EVERY 8 HOURS
Qty: 40 ML | Refills: 0 | Status: COMPLETED | OUTPATIENT
Start: 2024-04-10 | End: 2024-04-10

## 2024-04-10 RX ORDER — PHENYLEPHRINE HCL 10 MG/ML
VIAL (ML) INJECTION AS NEEDED
Status: DISCONTINUED | OUTPATIENT
Start: 2024-04-10 | End: 2024-04-10 | Stop reason: SURG

## 2024-04-10 RX ORDER — NALOXONE HYDROCHLORIDE 0.4 MG/ML
80 INJECTION, SOLUTION INTRAMUSCULAR; INTRAVENOUS; SUBCUTANEOUS AS NEEDED
Status: DISCONTINUED | OUTPATIENT
Start: 2024-04-10 | End: 2024-04-10 | Stop reason: HOSPADM

## 2024-04-10 RX ORDER — OXYCODONE HYDROCHLORIDE 5 MG/1
2.5 TABLET ORAL EVERY 4 HOURS PRN
Status: DISCONTINUED | OUTPATIENT
Start: 2024-04-10 | End: 2024-04-11

## 2024-04-10 RX ORDER — CLONAZEPAM 0.5 MG/1
1 TABLET ORAL NIGHTLY
Status: DISCONTINUED | OUTPATIENT
Start: 2024-04-10 | End: 2024-04-11

## 2024-04-10 RX ORDER — TRAMADOL HYDROCHLORIDE 50 MG/1
50 TABLET ORAL EVERY 6 HOURS PRN
Status: DISCONTINUED | OUTPATIENT
Start: 2024-04-10 | End: 2024-04-11

## 2024-04-10 RX ORDER — ENEMA 19; 7 G/133ML; G/133ML
1 ENEMA RECTAL ONCE AS NEEDED
Status: DISCONTINUED | OUTPATIENT
Start: 2024-04-10 | End: 2024-04-11

## 2024-04-10 RX ORDER — MORPHINE SULFATE 4 MG/ML
2 INJECTION, SOLUTION INTRAMUSCULAR; INTRAVENOUS EVERY 10 MIN PRN
Status: DISCONTINUED | OUTPATIENT
Start: 2024-04-10 | End: 2024-04-10 | Stop reason: HOSPADM

## 2024-04-10 RX ORDER — HYDROMORPHONE HYDROCHLORIDE 1 MG/ML
0.6 INJECTION, SOLUTION INTRAMUSCULAR; INTRAVENOUS; SUBCUTANEOUS EVERY 5 MIN PRN
Status: DISCONTINUED | OUTPATIENT
Start: 2024-04-10 | End: 2024-04-10 | Stop reason: HOSPADM

## 2024-04-10 RX ORDER — ONDANSETRON 2 MG/ML
4 INJECTION INTRAMUSCULAR; INTRAVENOUS EVERY 6 HOURS PRN
Status: DISCONTINUED | OUTPATIENT
Start: 2024-04-10 | End: 2024-04-10 | Stop reason: HOSPADM

## 2024-04-10 RX ORDER — ATORVASTATIN CALCIUM 10 MG/1
10 TABLET, FILM COATED ORAL DAILY
Status: DISCONTINUED | OUTPATIENT
Start: 2024-04-11 | End: 2024-04-11

## 2024-04-10 RX ORDER — TRAMADOL HYDROCHLORIDE 50 MG/1
100 TABLET ORAL EVERY 6 HOURS PRN
Status: DISCONTINUED | OUTPATIENT
Start: 2024-04-10 | End: 2024-04-11

## 2024-04-10 RX ORDER — HYDROMORPHONE HYDROCHLORIDE 1 MG/ML
0.2 INJECTION, SOLUTION INTRAMUSCULAR; INTRAVENOUS; SUBCUTANEOUS EVERY 2 HOUR PRN
Status: DISCONTINUED | OUTPATIENT
Start: 2024-04-10 | End: 2024-04-11

## 2024-04-10 RX ORDER — HYDROMORPHONE HYDROCHLORIDE 1 MG/ML
0.4 INJECTION, SOLUTION INTRAMUSCULAR; INTRAVENOUS; SUBCUTANEOUS EVERY 5 MIN PRN
Status: DISCONTINUED | OUTPATIENT
Start: 2024-04-10 | End: 2024-04-10 | Stop reason: HOSPADM

## 2024-04-10 RX ORDER — SENNOSIDES 8.6 MG
17.2 TABLET ORAL NIGHTLY
Status: DISCONTINUED | OUTPATIENT
Start: 2024-04-10 | End: 2024-04-11

## 2024-04-10 RX ORDER — DIPHENHYDRAMINE HYDROCHLORIDE 50 MG/ML
25 INJECTION INTRAMUSCULAR; INTRAVENOUS EVERY 4 HOURS PRN
Status: DISCONTINUED | OUTPATIENT
Start: 2024-04-10 | End: 2024-04-11

## 2024-04-10 RX ORDER — LEVOTHYROXINE SODIUM 0.07 MG/1
75 TABLET ORAL
Status: DISCONTINUED | OUTPATIENT
Start: 2024-04-11 | End: 2024-04-11

## 2024-04-10 RX ORDER — SODIUM CHLORIDE, SODIUM LACTATE, POTASSIUM CHLORIDE, CALCIUM CHLORIDE 600; 310; 30; 20 MG/100ML; MG/100ML; MG/100ML; MG/100ML
INJECTION, SOLUTION INTRAVENOUS CONTINUOUS
Status: DISCONTINUED | OUTPATIENT
Start: 2024-04-10 | End: 2024-04-11

## 2024-04-10 RX ORDER — ACETAMINOPHEN 500 MG
1000 TABLET ORAL ONCE
Status: DISCONTINUED | OUTPATIENT
Start: 2024-04-10 | End: 2024-04-10 | Stop reason: HOSPADM

## 2024-04-10 RX ORDER — ACETAMINOPHEN 325 MG/1
650 TABLET ORAL EVERY 6 HOURS PRN
Status: DISCONTINUED | OUTPATIENT
Start: 2024-04-10 | End: 2024-04-11

## 2024-04-10 RX ORDER — SODIUM CHLORIDE 9 MG/ML
INJECTION, SOLUTION INTRAVENOUS CONTINUOUS PRN
Status: DISCONTINUED | OUTPATIENT
Start: 2024-04-10 | End: 2024-04-10 | Stop reason: SURG

## 2024-04-10 RX ORDER — POLYETHYLENE GLYCOL 3350 17 G/17G
17 POWDER, FOR SOLUTION ORAL DAILY PRN
Status: DISCONTINUED | OUTPATIENT
Start: 2024-04-10 | End: 2024-04-11

## 2024-04-10 RX ORDER — METHOCARBAMOL 500 MG/1
500 TABLET, FILM COATED ORAL 3 TIMES DAILY PRN
Status: DISCONTINUED | OUTPATIENT
Start: 2024-04-10 | End: 2024-04-11

## 2024-04-10 RX ORDER — ONDANSETRON 2 MG/ML
INJECTION INTRAMUSCULAR; INTRAVENOUS AS NEEDED
Status: DISCONTINUED | OUTPATIENT
Start: 2024-04-10 | End: 2024-04-10 | Stop reason: SURG

## 2024-04-10 RX ADMIN — PHENYLEPHRINE HCL 100 MCG: 10 MG/ML VIAL (ML) INJECTION at 07:48:00

## 2024-04-10 RX ADMIN — DEXAMETHASONE SODIUM PHOSPHATE 8 MG: 4 MG/ML VIAL (ML) INJECTION at 08:57:00

## 2024-04-10 RX ADMIN — PHENYLEPHRINE HCL 100 MCG: 10 MG/ML VIAL (ML) INJECTION at 08:28:00

## 2024-04-10 RX ADMIN — MIDAZOLAM HYDROCHLORIDE 2 MG: 1 INJECTION INTRAMUSCULAR; INTRAVENOUS at 07:34:00

## 2024-04-10 RX ADMIN — ONDANSETRON 4 MG: 2 INJECTION INTRAMUSCULAR; INTRAVENOUS at 08:57:00

## 2024-04-10 RX ADMIN — PHENYLEPHRINE HCL 100 MCG: 10 MG/ML VIAL (ML) INJECTION at 08:06:00

## 2024-04-10 RX ADMIN — SODIUM CHLORIDE: 9 INJECTION, SOLUTION INTRAVENOUS at 07:34:00

## 2024-04-10 RX ADMIN — CEFAZOLIN SODIUM/WATER 2 G: 2 G/20 ML SYRINGE (ML) INTRAVENOUS at 07:34:00

## 2024-04-10 RX ADMIN — PHENYLEPHRINE HCL 100 MCG: 10 MG/ML VIAL (ML) INJECTION at 08:15:00

## 2024-04-10 RX ADMIN — PHENYLEPHRINE HCL 100 MCG: 10 MG/ML VIAL (ML) INJECTION at 08:01:00

## 2024-04-10 NOTE — OPERATIVE REPORT
AZALEA CUMMINGS M.D., F.A.A.N.S.     of Neurosurgery  [facility]  Board Certified Neurosurgeon                          [facility]  part of [address]  [address] [place] [address]    PHONE  [phone]          FAX  [phone]    [email or web address]        OPERATIVE REPORT      PATIENT NAME: Damaris Ratliff    DATE OF OPERATION:  4/10/2024    PREOPERATIVE DIAGNOSIS:  1. Grade 2 C4-5 spondylolisthesis             2. C5-6 spondylolisthesis    POSTOPERATIVE DIAGNOSIS: 1. same               2. same    OPERATIVE PROCEDURE:  1.  Anterior cervical complete corpectomy of C5  2.  Anterior cervical discectomy and arthrodesis at C4-5 and C5-6 adjacent to the corpectomy level(s)   3.  Placement of anterior cervical corpectomy cage at C5 level and arthrodesis from C4 to C6 utilizing allograft and locally harvested morselized autograft.  4.  Anterior cervical plate and screw instrumentation C4-6  5.  Real time intraoperative fluoroscopy and C-arm with the image guidance.  6.  Real time intraoperative motor-evoked potentials, SSEP and nerve monitoring.    CPT CODES: 07499, 97122, 19652, 02675, 75294-28, 11255, 66824, 05489-95    SURGEON:  Azalea Cummings M.D.    ASSISTANT: Kole Quinteros PA-C    ANESTHESIA: General endotracheal anesthesia with TIVA and local anesthetic.    EBL: 5 cc    INTRAVENOUS FLUIDS AND URINE OUTPUT: Per anesthesia sheet    TRANSFUSIONS: None    IMPLANTS: Nuva    DRAIN: medium HV    SPECIMEN: None    COMPLICATIONS: none    PROCEDURE:  After informed consent was obtained, the patient was brought to the operating room. The patient was then placed on the operating room table in the supine position.  After the uneventful induction of general endotracheal anesthesia, electrodes and monitoring devices were placed, when utilized.  The shoulders were supported and the neck physiologically extended. All pressure points were adequately padded. Baseline electrophysiological potentials were obtained, when monitoring was being used. Subsequently, we utilized lateral fluoroscopic guidance to identify our incision site which was marked out on the anterior left side of the neck.  The patient was prepped and draped sterilely.  The C-arm was also draped sterilely into the field. Time-Out was done in the proper fashion. Perioperative antibiosis was given within one hour of incision.     After Time-Out, we infiltrated the incision with our usual local anesthetic. A skin crease incision was made in the anterior triangle of the neck.  The subcutaneous tissues were opened sharply and dissected off the platysma.  The platysma was then opened perpendicular to its fibers.  Using both sharp and blunt dissection, the trachea and esophagus were dissected medially and the carotid sheath structures dissected laterally to arrive at the prevertebral space.  The C4-5 disc space was marked with a pre-bent spinal needle and this was confirmed with fluoroscopy images.  Using monopolar and bipolar coagulation, the longus colli muscles were then dissected off the corresponding vertebral bodies and the soft tissues were cleared off the anterior vertebral column. Self-retaining retractors were placed into the trough created by the dissection. These retractors were secured to a table-mounted arm for added stability. The cuff on the endotracheal tube was mildly deflated by anesthesia. At this point in time, the C4-5disc space was marked out and confirmed with fluoroscopic guidance.  Distraction pins were placed, utilizing fluoroscopic guidance in the C4and the C6 vertebral bodies and the segment was placed under light distraction.  We then opened the respective disc spaces with a disc knife and proceeded to perform a complete disc resection of the C4-5 and the C5-6 discs all  the way to the posterior osteophytes.  Again, the cartilaginous endplates were removed using series of curettes, rongeurs, exposing the posterior annulus and the posterior longitudinal ligament.  We then proceeded with our corpectomy by utilizing a high-speed bur and drilling perpendicularly along the outer aspect of the [id number] body on either side, creating a midline bony keel.  This bone was harvested with a small Leksell rongeur and saved for the arthrodesis process utilizing the corpectomy cage later during the procedure.  In this fashion we completed a corpectomy involving close to 90% of the vertebral body.  Foraminotomies at the level above and below were performed bilaterally.  The thecal sac and neural elements were completely decompressed.  All bony edges were treated with bone wax to prevent any postoperative oozing.  At this point in time, we then measured for the proper sized cage which was assembled on the back table and filled with our locally harvested autograft, mixed with Osteocel Pro, or allograft of choice. The expandable corpectomy cage was then placed and impacted under direct vision and utilizing fluoroscopic guidance. Once properly positioned, when we were satisfied, we released the distraction across the segment, removed the distraction pins and treated the pin holes with bone wax to prevent any post-operative oozing.  We then proceeded to expand the cage sequentially utilizing fluoroscopic guidance until we were happy with the restoration of the anterior tension band.  We secured the backing out mechanism to prevent collapse of the expanded cage and post packed it with more autograft.  Another series of motor-evoked potentials was run and this remained stable.     The properly-sized anterior cervical plate was chosen and found to fit the construct nicely.  This was placed over the vertebral column and gently contoured to fit the spine. A hand drill was used to prepare  holes in the  corresponding vertebral bodies, C4 and C6, utilizing fluoroscopic guidance. We then placed the screws, attempting bicortical purchase, in the corresponding vertebral bodies until the locking mechanism of the plate was engaged. Fluoroscopy confirmed satisfactory position of the screws as well as the plate and cage. When utilizing intraoperative monitoring, another set of motor-evoked potentials was run and remained stable.  The self-retaining retractors were then removed and the wound was copiously irrigated.  Hand-held retractors were placed and small bleeding points were controlled with bipolar electrocautery.  More irrigation was used and hemostasis was confirmed meticulously and augmented with the use of Surgiflow or FlowSeal.     A medium hemovac drain was placed in the prevertebral space and brought out through a separate stab incision in the skin.  The drain was secured with a Bio-patch and covered with a Tegaderm.     The platysma was then closed using a series of interrupted and running 3-0 Vicryl sutures. The subcutaneous tissues were copiously irrigated and closed with an interrupted inverted 4-0 Vicryl suture.  The skin was closed with Dermabond.      There were no complications.  All counts were reported correct. When utilized, motor-evoked potentials and the free-run EMG nerve monitoring remained stable throughout the entire procedure.  The patient was extubated in the operating room and taken to the recovery room in satisfactory condition, moving all four extremities strongly to command.      Emigdio Michael M.D., F.A.A.N.S.    4/10/2024  9:03 AM

## 2024-04-10 NOTE — PLAN OF CARE
EMIGDIO CUMMINGS M.D., F.A.A.N.S.     of Neurosurgery  [facility]  Board Certified Neurosurgeon                              [facility]        [place] [facility]      [address] [facility] [facility]  [address] [place] [id number]    PHONE  [phone]          FAX  [phone]    [email or web address]    [facility]  part of [facility]     ACDF DISCLAIMER AND CONSENT              4/10/2024  6:52 AM    PRE-OPERATIVE CONSULTATION    Damarisnetta Ratliff was again informed of the nature of the problem, planned treatment, indications and alternatives. We discussed the use of the intervertebral spacers, biologics, plate and screws, when utilized. We again reviewed the expected benefits of surgery, as well as all reasonably foreseeable risks, including but not limited to, infection, bleeding requiring transfusion or reoperation, no relief or worsening of the pain, numbness, weakness, need for assistive devices to get around, injury to the spinal cord or nerves, paralysis, loss of control of the arms/legs/bladder/bowel/sexual function, spinal fluid leak, sore throat, hoarseness of voice, difficulty swallowing, hole in the esophagus, non-healing of the bones, break of the plate and screws (both made more likely with smoking or use of nicotine substitutes), scar formation, heart attack, blood clot formation, pulmonary embolism, stroke, coma and death.  her questions were all answered and she understands what we discussed.  Damaris Ratliff also understands that no guarantees can be made regarding outcome or results and  she agrees the benefits of surgery outweigh the risks and wishes to proceed with surgery.        Emigdio Cummings M.D., F.LOUISE.A.N.S.

## 2024-04-10 NOTE — ANESTHESIA POSTPROCEDURE EVALUATION
Patient: Damaris Ratliff    Procedure Summary       Date: 04/10/24 Room / Location: [facility] MAIN OR 09 / EM MAIN OR    Anesthesia Start: 0730 Anesthesia Stop:     Procedure: Cervical 5 corpectomy and cervical 4-6 discectomy and fusion (Spine Cervical) Diagnosis:       Acquired spondylolisthesis of cervical vertebra      Cervical stenosis of spinal canal      (Acquired spondylolisthesis of cervical vertebra [M43.12]Cervical stenosis of spinal canal [M48.02])    Surgeons: Emigdio Michael MD Anesthesiologist: Lorna Sharp MD    Anesthesia Type: general ASA Status: 2            Anesthesia Type: general    Vitals Value Taken Time   /66 04/10/24 0942   Temp 97.3 °F (36.3 °C) 04/10/24 0942   Pulse 73 04/10/24 0943   Resp 0 04/10/24 0943   SpO2 96 % 04/10/24 0943   Vitals shown include unfiled device data.    [facility] AN Post Evaluation:   Patient Evaluated in PACU  Patient Participation: complete - patient participated  Level of Consciousness: awake  Pain Score: 0  Pain Management: adequate  Airway Patency:patent  Dental exam unchanged from preop  Yes    Cardiovascular Status: acceptable  Respiratory Status: acceptable  Postoperative Hydration acceptable      LORNA SHARP MD  4/10/2024 9:44 AM

## 2024-04-10 NOTE — ANESTHESIA PROCEDURE NOTES
Peripheral IV  Date/Time: 4/10/2024 7:45 AM  Inserted by: Dov Savage MD    Placement  Laterality: left  Location: arm  Local anesthetic: none  Site prep: alcohol  Attempts: 1

## 2024-04-10 NOTE — CONSULTS
[facility]    PATIENT'S NAME: CLIVE RODRIGUEZ   ATTENDING PHYSICIAN: Emigdio Michael MD   CONSULTING PHYSICIAN: Tommy Moore MD   PATIENT ACCOUNT#:   [id number]    LOCATION:  [facility] PACU 3 [facility] 10  MEDICAL RECORD #:   [id number]       YOB: 1960  ADMISSION DATE:       04/10/2024      CONSULT DATE:  04/10/2024    REPORT OF CONSULTATION    REASON FOR CONSULTATION:  Post anterior cervical discectomy and fusion.    HISTORY OF PRESENT ILLNESS:  The patient is a 63-year-old  female with chronic neck pain.  Evaluated in the outpatient setting and had an MRI scan of cervical spine that showed C4-5 spondylolisthesis and severe stenosis.  She had failed outpatient conservative medical management options and scheduled today by Dr. Emigdio Michael for above-mentioned procedure.  Postoperatively transferred to PACU for further monitoring.    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    PAST SURGICAL HISTORY:  Three C-sections, bowel obstruction procedure, splenectomy, and today anterior cervical discectomy and fusion.    MEDICATIONS:  Please see medication reconciliation list.    SOCIAL HISTORY:  No tobacco, occasional alcohol.  Lives with her family.  Independent in her basic activities of daily living.    FAMILY HISTORY:  Father had cancer.    REVIEW OF SYSTEMS:  Currently resting in bed.  She had neck discomfort rated 4/10.  No upper extremity tingling, numbness, or weakness.  No chest pain, no shortness of breath.  Other 12-point review of systems negative.      PHYSICAL EXAMINATION:    GENERAL:  Alert.  Oriented to time, place, and person.  No acute distress.  VITAL SIGNS:  Temperature 98.1, pulse 67, respiratory rate 11, blood pressure 118/85, pulse ox 95% on room air.  HEENT:  Atraumatic.  Oropharynx clear.  NECK:  Anterior  Dermabond.  Cervical collar.  Hemovac surgical drain.  LUNGS:  Clear to auscultation bilaterally.  HEART:  Regular rate and rhythm.  S1, S2 auscultated.  ABDOMEN:  Soft, nondistended, no tenderness.  Positive bowel sounds.  EXTREMITIES:  No peripheral edema, clubbing, or cyanosis.  NEUROLOGIC:  Motor and sensory intact.    ASSESSMENT AND PLAN:    1.   Cervical spinal stenosis with spondylolisthesis, status post anterior cervical complete corpectomy of C5; anterior cervical diskectomy; and arthrodesis of C4-5 and C5-6 adjacent to the corpectomy level; placement of anterior cervical corpectomy cage C5 level; and arthrodesis from C4 to C6 utilizing allograft and locally harvested morselized autograft anterior cervical plate and screw instrumentation at C4 and C6.  Pain control, neuro checks, monitor surgical wound and drain, DVT prophylaxis, physical and occupational therapy.  2.   Essential hypertension.  Continue home medications and monitor.  3.   Hypothyroidism.  Continue home medication.  4.   Hyperlipidemia.  Continue home medication.    Dictated By Tommy Moore MD  d: 04/10/2024 10:51:14  t: 04/10/2024 10:54:58  [facility] [phone]  FB/

## 2024-04-10 NOTE — ANESTHESIA PROCEDURE NOTES
Airway  Date/Time: 4/10/2024 7:36 AM  Urgency: Elective    Airway not difficult    General Information and Staff    Patient location during procedure: OR  Anesthesiologist: Dov Savage MD  Performed: anesthesiologist   Performed by: Dov Savage MD  Authorized by: Dov Savage MD      Indications and Patient Condition  Indications for airway management: anesthesia  Sedation level: deep  Preoxygenated: yes  Patient position: sniffing  Mask difficulty assessment: 1 - vent by mask    Final Airway Details  Final airway type: endotracheal airway      Successful airway: ETT  Cuffed: yes   Successful intubation technique: Video laryngoscopy  Endotracheal tube insertion site: oral  ETT size (mm): 7.0    Cormack-Lehane Classification: grade I - full view of glottis  Placement verified by: capnometry   Measured from: teeth  ETT to teeth (cm): 20  Number of attempts at approach: 1    Additional Comments  Head resting in pillow   Easy visualization and intubation   Following intubation head positioned per surgeon for reduction

## 2024-04-10 NOTE — PLAN OF CARE
Problem: Patient Centered Care  Goal: Patient preferences are identified and integrated in the patient's plan of care  Description: Interventions:  - What would you like us to know as we care for you? Patient is from home and her  is her support system.  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 Ratliff here in [facility] (ortho).  - Provide timely, complete, and accurate information to patient/family  - Incorporate patient and family knowledge, values, beliefs, and cultural backgrounds into the planning and delivery of care  - Encourage patient/family to participate in care and decision-making at the level they choose  - Honor patient and family perspectives and choices  Outcome: Progressing     Problem: Patient/Family Goals  Goal: Patient/Family Long Term Goal  Description: Patient's Long Term Goal: Will have no complications from surgery.    Interventions:  - Up as tolerated.  - No bending, heavy lifting nor twisting above neck area.  - Monitor incision for any signs of infection.  - Pain management with oral medication.  - Maintain cervical collar at all times except when eating and showering.  - See additional Care Plan goals for specific interventions  Outcome: Progressing  Goal: Patient/Family Short Term Goal  Description: Patient's Short Term Goal: Home when stable.    Interventions:   - Up as tolerated.  - No bending, heavy lifting nor twisting above neck area.  - Monitor incision for any signs of infection.  - Pain management with oral medication.  - Maintain cervical collar at all times   - ZOHRA hose and SCD's to both legs.  - PT/OT as ordered.  - See additional Care Plan goals for specific interventions  Outcome: Progressing     Problem: PAIN - ADULT  Goal: Verbalizes/displays adequate comfort level or patient's stated pain goal  Description: INTERVENTIONS:  - Encourage pt to monitor pain and request assistance  - Assess pain using appropriate pain scale  - Administer  analgesics based on type and severity of pain and evaluate response  - Implement non-pharmacological measures as appropriate and evaluate response  - Consider cultural and social influences on pain and pain management  - Manage/alleviate anxiety  - Utilize distraction and/or relaxation techniques  - Monitor for opioid side effects  - Notify MD/LIP if interventions unsuccessful or patient reports new pain  - Anticipate increased pain with activity and pre-medicate as appropriate  Outcome: Progressing     Problem: RISK FOR INFECTION - ADULT  Goal: Absence of fever/infection during anticipated neutropenic period  Description: INTERVENTIONS  - Monitor WBC  - Administer growth factors as ordered  - Implement neutropenic guidelines  Outcome: Progressing     Problem: SAFETY ADULT - FALL  Goal: Free from fall injury  Description: INTERVENTIONS:  - Assess pt frequently for physical needs  - Identify cognitive and physical deficits and behaviors that affect risk of falls.  - [place] fall precautions as indicated by assessment.  - Educate pt/family on patient safety including physical limitations  - Instruct pt to call for assistance with activity based on assessment  - Modify environment to reduce risk of injury  - Provide assistive devices as appropriate  - Consider OT/PT consult to assist with strengthening/mobility  - Encourage toileting schedule  Outcome: Progressing     Problem: SKIN/TISSUE INTEGRITY - ADULT  Goal: Incision(s), wounds(s) or drain site(s) healing without S/S of infection  Description: INTERVENTIONS:  - Assess and document risk factors for pressure ulcer development  - Assess and document skin integrity  - Assess and document dressing/incision, wound bed, drain sites and surrounding tissue  - Implement wound care per orders  - Initiate isolation precautions as appropriate  - Initiate Pressure Ulcer prevention bundle as indicated  Outcome: Progressing     Problem: MUSCULOSKELETAL - ADULT  Goal: Return  mobility to safest level of function  Description: INTERVENTIONS:  - Assess patient stability and activity tolerance for standing, transferring and ambulating w/ or w/o assistive devices  - Assist with transfers and ambulation using safe patient handling equipment as needed  - Ensure adequate protection for wounds/incisions during mobilization  - Obtain PT/OT consults as needed  - Advance activity as appropriate  - Communicate ordered activity level and limitations with patient/family  Outcome: Progressing  Goal: Maintain proper alignment of affected body part  Description: INTERVENTIONS:  - Support and protect limb and body alignment per provider's orders  - Instruct and reinforce with patient and family use of appropriate assistive device and precautions (e.g. spinal or hip dislocation precautions)  Outcome: Progressing    Patient is alert and oriented, aware to call for help as needed.  Patient is currently in room air, denies shortness of breathing nor chest pain.  Patient is up ambulating with  in the hallway this afternoon. Patient is managed with oral medication.  Patient is voiding well.  Patient will be going home in AM when stable.

## 2024-04-10 NOTE — INTERVAL H&P NOTE
Pre-op Diagnosis: Acquired spondylolisthesis of cervical vertebra [M43.12]  Cervical stenosis of spinal canal [M48.02]    The above referenced H&P was reviewed by Emigdio Michael MD on 4/10/2024, the patient was examined and no significant changes have occurred in the patient's condition since the H&P was performed.  I discussed with the patient and/or legal representative the potential benefits, risks and side effects of this procedure; the likelihood of the patient achieving goals; and potential problems that might occur during recuperation.  I discussed reasonable alternatives to the procedure, including risks, benefits and side effects related to the alternatives and risks related to not receiving this procedure.  We will proceed with procedure as planned.

## 2024-04-10 NOTE — DISCHARGE INSTRUCTIONS
Post- Operative Instructions- Cervical:    Activity-     You may move your neck as much as comfortable following your procedure. If you were provided with a cervical collar it should be worn at all times until your 2-week post-operative visit.     Avoid lifting more than 5-10 pounds and pushing/pulling more than 10-15 pounds. Avoid lifting/reaching overhead.     Avoid driving following your procedure. We will make driving recommendations at the time of your post-operative visit 2 weeks following your surgery. Do not drive while taking narcotic pain medication.     Avoid prolonged sitting/standing. Repositioning yourself frequently will prevent stiffness and promote blood flow which decreases your risk for a blood clot.     Throat soreness is anticipated following your procedure. Eating softer foods and/or using throat lozenges may help manage this discomfort.     Avoid NSAID (Non-steroidal Anti-inflammatory Drugs] following your procedure unless approved by your provider. These medications include: Ibuprofen, Aleve, Advil, Meloxicam, Diclofenac, Celebrex.    Take pain medications as needed per instructions. Pain medications may cause constipation. If you experience constipation: drink plenty of water, increase your activity as tolerated and take an over-the-counter stool softener daily as needed.      Specific shower recommendations are listed below under “Incision” and are determined by your surgical closure type. All patients should avoid baths, swimming, hot tubs. Once it is safe for you to shower, do not allow water to spray directly on your incision site. Do not scrub or wash your incision. Gently pat dry following shower and avoid any lotions/creams/perfumes near your surgical site.    If instrumentation was placed during your procedure (cage, screws, plate) we recommend that you take antibiotics prior to any dental procedures for 12 months following your surgery. Antibiotic prescriptions are written and  managed by your dentist.    Incision-    With clean hands, remove any surgical dressing 3 days following your procedure. A surgical dressing consists of gauze/large tape. Do not remove any glue or thin steri-strips. Staples and non-dissolvable sutures will be removed at your two-week post-operative appointment in our clinic.     Surgical Glue will gradually come off on its own. Do not rub or pick at the glue. Surgical glue may darken over time. That is normal. You may shower the day after your procedure.    Steri-strips are a sterile adhesive which appear tape-like. They are expected to fall off on their own over time. Do not pick at strips. You may shower 4 days after your procedure.    When to contact the clinic:    Drainage at your incision site. It is normal for your surgical dressing to have dried drainage following your procedure. If your incision is consistently leaking new drainage of any kind (bloody, clear, green/yellow tinged) please contact our office to notify your provider.     Changes in your incision site such as increased redness, swelling or warmth to the touch.     Leg pain that is associated with redness, swelling or warmth to the touch.     Bowel or bladder changes including incontinence/inability to make it to the bathroom in time.     New pain If you were experiencing pain prior to surgery, you may experience fluctuating pain levels in the same location as your nerves heal post-operatively. If you experience new pain in a location where you did not have pain prior to surgery, please contact the office to notify your provider.    New or worsening weakness in arms or legs    Fever above 101 degrees Fahrenheit It is normal for your body temperature to increase slightly following a surgical procedure. Please notify your physician if your temperature is above 101 degrees Fahrenheit.

## 2024-04-11 VITALS
WEIGHT: 113 LBS | DIASTOLIC BLOOD PRESSURE: 82 MMHG | HEIGHT: 62 IN | HEART RATE: 78 BPM | OXYGEN SATURATION: 96 % | TEMPERATURE: 98 F | RESPIRATION RATE: 18 BRPM | BODY MASS INDEX: 20.8 KG/M2 | SYSTOLIC BLOOD PRESSURE: 118 MMHG

## 2024-04-11 PROCEDURE — 99239 HOSP IP/OBS DSCHRG MGMT >30: CPT | Performed by: INTERNAL MEDICINE

## 2024-04-11 RX ORDER — ONDANSETRON 4 MG/1
4 TABLET, ORALLY DISINTEGRATING ORAL EVERY 4 HOURS PRN
Qty: 10 TABLET | Refills: 0 | Status: SHIPPED | OUTPATIENT
Start: 2024-04-11

## 2024-04-11 RX ORDER — TRAMADOL HYDROCHLORIDE 50 MG/1
50 TABLET ORAL EVERY 6 HOURS PRN
Qty: 28 TABLET | Refills: 0 | Status: SHIPPED | OUTPATIENT
Start: 2024-04-11 | End: 2024-04-18

## 2024-04-11 NOTE — PLAN OF CARE
Patient alert and oriented x 4. Post op day 1. Patient denied the need for pain medication. Dressing is dermabond; MACY. Aspen collar on and aligned. Up with independently. Voiding freely. Patients diet is general. SCDs and Teds for VTE prophylaxis. Vital signs monitored. Cough and deep breathe in addition to incentive spirometer. Bed in lowest position, call light within reach, and non skid socks in place for fall precautions. All needs within reach. Rounding done by nursing staff. Plan for discharge is home when cleared.     Patient cleared for discharge. After visit summary reviewed with patient. Aware to  medications at pharmacy on file. Personal belongings sent with patient. Transported via private car.     Problem: Patient Centered Care  Goal: Patient preferences are identified and integrated in the patient's plan of care  Description: Interventions:  - What would you like us to know as we care for you? Patient is from home and her  is her support system.  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 Ratliff here in [facility] (ortho).  - Provide timely, complete, and accurate information to patient/family  - Incorporate patient and family knowledge, values, beliefs, and cultural backgrounds into the planning and delivery of care  - Encourage patient/family to participate in care and decision-making at the level they choose  - Honor patient and family perspectives and choices  Outcome: Progressing     Problem: Patient/Family Goals  Goal: Patient/Family Long Term Goal  Description: Patient's Long Term Goal: Will have no complications from surgery.    Interventions:  - Up as tolerated.  - No bending, heavy lifting nor twisting above neck area.  - Monitor incision for any signs of infection.  - Pain management with oral medication.  - Maintain cervical collar at all times except when eating and showering.  - See additional Care Plan goals for specific interventions  Outcome:  Progressing  Goal: Patient/Family Short Term Goal  Description: Patient's Short Term Goal: Home when stable.    Interventions:   - Up as tolerated.  - No bending, heavy lifting nor twisting above neck area.  - Monitor incision for any signs of infection.  - Pain management with oral medication.  - Maintain cervical collar at all times   - ZOHRA hose and SCD's to both legs.  - PT/OT as ordered.  - See additional Care Plan goals for specific interventions  Outcome: Progressing     Problem: PAIN - ADULT  Goal: Verbalizes/displays adequate comfort level or patient's stated pain goal  Description: INTERVENTIONS:  - Encourage pt to monitor pain and request assistance  - Assess pain using appropriate pain scale  - Administer analgesics based on type and severity of pain and evaluate response  - Implement non-pharmacological measures as appropriate and evaluate response  - Consider cultural and social influences on pain and pain management  - Manage/alleviate anxiety  - Utilize distraction and/or relaxation techniques  - Monitor for opioid side effects  - Notify MD/LIP if interventions unsuccessful or patient reports new pain  - Anticipate increased pain with activity and pre-medicate as appropriate  Outcome: Progressing     Problem: RISK FOR INFECTION - ADULT  Goal: Absence of fever/infection during anticipated neutropenic period  Description: INTERVENTIONS  - Monitor WBC  - Administer growth factors as ordered  - Implement neutropenic guidelines  Outcome: Progressing     Problem: SAFETY ADULT - FALL  Goal: Free from fall injury  Description: INTERVENTIONS:  - Assess pt frequently for physical needs  - Identify cognitive and physical deficits and behaviors that affect risk of falls.  - [place] fall precautions as indicated by assessment.  - Educate pt/family on patient safety including physical limitations  - Instruct pt to call for assistance with activity based on assessment  - Modify environment to reduce risk of  injury  - Provide assistive devices as appropriate  - Consider OT/PT consult to assist with strengthening/mobility  - Encourage toileting schedule  Outcome: Progressing     Problem: SKIN/TISSUE INTEGRITY - ADULT  Goal: Incision(s), wounds(s) or drain site(s) healing without S/S of infection  Description: INTERVENTIONS:  - Assess and document risk factors for pressure ulcer development  - Assess and document skin integrity  - Assess and document dressing/incision, wound bed, drain sites and surrounding tissue  - Implement wound care per orders  - Initiate isolation precautions as appropriate  - Initiate Pressure Ulcer prevention bundle as indicated  Outcome: Progressing     Problem: MUSCULOSKELETAL - ADULT  Goal: Return mobility to safest level of function  Description: INTERVENTIONS:  - Assess patient stability and activity tolerance for standing, transferring and ambulating w/ or w/o assistive devices  - Assist with transfers and ambulation using safe patient handling equipment as needed  - Ensure adequate protection for wounds/incisions during mobilization  - Obtain PT/OT consults as needed  - Advance activity as appropriate  - Communicate ordered activity level and limitations with patient/family  Outcome: Progressing  Goal: Maintain proper alignment of affected body part  Description: INTERVENTIONS:  - Support and protect limb and body alignment per provider's orders  - Instruct and reinforce with patient and family use of appropriate assistive device and precautions (e.g. spinal or hip dislocation precautions)  Outcome: Progressing

## 2024-04-11 NOTE — OCCUPATIONAL THERAPY NOTE
OCCUPATIONAL THERAPY EVALUATION - INPATIENT     Room Number: 433/433-A  Evaluation Date: 4/11/2024  Type of Evaluation: Initial  Presenting Problem: C-spine    Physician Order: IP Consult to Occupational Therapy  Reason for Therapy: ADL/IADL Dysfunction and Discharge Planning    OCCUPATIONAL THERAPY ASSESSMENT   Patient is a 63 year old female admitted 4/10/2024 for cervical fusion; c-collar to be worn at all times with exception of bathing.  Prior to admission, patient's baseline is independent and working.        Skilled Therapy Provided:    Therapeutic Activities: Patient rcvd in bed; Prior to initiating dynamic activities, provided education on spine precautions and log roll for OOB activities. Patient's vitals were monitored closely with position changes to ensure appropriate response to activities. Patient was able to complete bed mobility with log roll technique no assist and cues to sequence and for hand placement; Patient tolerated ~sitting at EOB MARIA DEL CARMEN; Demonstrated good static sitting balance. Educated on STS sequencing and hand placement; transitions completed MARIA DEL CARMEN. Tolerating functional mobility with no device >community distances (exceeding 200 feet) MARIA DEL CARMEN. Functional TF completed MARIA DEL CARMEN. Education provided on increased IMELDA and to limit forward flexion for STS. While engaged in dynamic activities; pt encouraged to pace tasks allow time for rest breaks- standing or seated. Patient encouraged to complete as many basic tasks seated as allowed for more tolerance for dynamic activities.       ADL Retraining/Engagment:  Extra time was spent with patient reviewing \"Back on Track\" handout to address specific self care compensations and higher level IADL with return to home. Educated on optimal setup for items at home to reduce excessive reaching and bending. Patient was able to demonstrate figure  4 for LE dressing; ambulatory to bathroom and completing toileting tasks at baseline level of function, toilet t/f  completed with education on sequencing and hand placement. Patient demonstrates sufficient UE ROM/Strength to address UE self care including feeding, grooming, and dressing.     Education Provided: Role of OT, POC, Safety, DC recs, Activity promotion, Activity pacing, Energy Conservation Strategies, Return to Home Safety.       PLAN  Patient has been evaluated and presents with no skilled Occupational Therapy needs  at this time.  Patient will be discharged from Occupational Therapy services. Please re-order if a new functional limitation presents during this admission.  OT Device Recommendations: None    OCCUPATIONAL THERAPY MEDICAL/SOCIAL HISTORY   Problem List  Principal Problem:    Cervical spinal stenosis  Active Problems:    S/P cervical spinal fusion    Essential hypertension    Hyperlipidemia    Hypothyroidism    HOME SITUATION  Type of Home: House  Home Layout: Multi-level  Lives With: Spouse  Toilet and Equipment: Standard height toilet  Shower/Tub and Equipment: Walk-in shower  Drives: Yes  Patient Regularly Uses: None    SUBJECTIVE  Agreeable to all activity presented  Pleasant and cooperative  Motivated to return home     OCCUPATIONAL THERAPY EXAMINATION    OBJECTIVE  Precautions: Cervical brace  Fall Risk: Standard fall risk    WEIGHT BEARING RESTRICTION     PAIN ASSESSMENT  Ratin    ACTIVITIES OF DAILY LIVING ASSESSMENT  AM-PAC ‘6-Clicks’ Inpatient Daily Activity Short Form  How much help from another person does the patient currently need…  -   Putting on and taking off regular lower body clothing?: None  -   Bathing (including washing, rinsing, drying)?: None  -   Toileting, which includes using toilet, bedpan or urinal? : None  -   Putting on and taking off regular upper body clothing?: None  -   Taking care of personal grooming such as brushing teeth?: None  -   Eating meals?: None    AM-PAC Score:  Score: 24  Approx Degree of Impairment: 0%  Standardized Score (AM-PAC Scale): 57.54  CMS  Modifier (G-Code): CH    FUNCTIONAL TRANSFER ASSESSMENT  Sit to Stand: Edge of Bed  Edge of Bed: Independent  Toilet Transfer: Independent    BED MOBILITY  Rolling: Independent  Supine to Sit : Independent  Sit to Supine (OT): Independent  Scooting: in    BALANCE ASSESSMENT  Static Sitting: Independent  Static Standing: Independent    FUNCTIONAL ADL ASSESSMENT  Eating: Independent  Grooming Seated: Independent  Bathing Seated: Independent  UB Dressing Seated: Independent  LB Dressing Seated: Independent  Toileting Seated: Independent      EDUCATION PROVIDED  Patient: Role of Occupational Therapy; Plan of Care; Discharge Recommendations; Functional Transfer Techniques; Surgical Precautions; Posture/Positioning; Energy Conservation; Compensatory ADL Techniques; Proper Body Mechanics  Patient's Response to Education: Verbalized Understanding    The patient's Approx Degree of Impairment: 0% has been calculated based on documentation in the [facility] '6 clicks' Inpatient Daily Activity Short Form.  Research supports that patients with this level of impairment may benefit from home with support PRN.  Final disposition will be made by interdisciplinary medical team.    Patient End of Session: Up in chair;Needs met;Call light within reach    Patient was able to achieve the following ...   Patient able to toilet transfer  met    Patient able to dress lower extremities  met    Patient/Caregiver able to demonstrate safety with ADLS  met     Patient Evaluation Complexity Level:   Occupational Profile/Medical History LOW   Specific performance deficits impacting engagement in ADL/IADL LOW  1 - 3 performance deficits    Client Assessment/Performance Deficits LOW - No comorbidities nor modifications of tasks    Clinical Decision Making LOW - Analysis of occupational profile, problem-focused assessments, limited treatment options    Overall Complexity LOW     OT Session Time: 24 minutes  Self-Care Home Management: 12  minutes  Therapeutic Activity: 12 minutes      Joe Kimbrough, Occupational Therapist, OTR/L ext [phone]

## 2024-04-11 NOTE — PHYSICAL THERAPY NOTE
PHYSICAL THERAPY EVALUATION - INPATIENT     Room Number: 433/433-A  Evaluation Date: 4/11/2024  Type of Evaluation: Initial   Physician Order: PT Eval and Treat    Presenting Problem: s/p C5 corpectomy and C4-6 disccectomy and fusion on 4/10     Reason for Therapy: Mobility Dysfunction and Discharge Planning    PHYSICAL THERAPY ASSESSMENT   Patient is a 63 year old female admitted 4/10/2024 for C5 corpectomy and C4-6 disccectomy and fusion.  Prior to admission, patient's baseline is independent with ADLs and functional mobility without assistive device.  Patient is currently functioning at baseline with bed mobility, transfers, gait, stair negotiation, standing prolonged periods, and performing household tasks.  Patient is requiring supervision only as a result of the following impairments: pain.  Given that patient is near/at baseline level and has adequate support at home, patient is safe to return home with no services. Will discharge from IP PT services at this time.     PLAN  Patient has been evaluated and presents with no skilled Physical Therapy needs at this time.  Patient will be discharged from Physical Therapy services. Please re-order if a new functional limitation presents during this admission.    PHYSICAL THERAPY MEDICAL/SOCIAL HISTORY     Problem List  Principal Problem:    Cervical spinal stenosis  Active Problems:    S/P cervical spinal fusion    Essential hypertension    Hyperlipidemia    Hypothyroidism      HOME SITUATION  Home Situation  Type of Home: House  Home Layout: Two level;Bed/bath upstairs  Stairs to Bedroom: 12  Railing: Yes  Lives With: Spouse  Drives: Yes  Patient Owned Equipment: None  Patient Regularly Uses: None     Prior Level of [place]: Indep    SUBJECTIVE  Agreeable to activity.     PHYSICAL THERAPY EXAMINATION   OBJECTIVE  Precautions: Cervical brace (Aspen collar at all times except eating and showering)  Fall Risk: Standard fall risk    WEIGHT BEARING  RESTRICTION  Weight Bearing Restriction: None    PAIN ASSESSMENT  Rating:  (did not rate)  Location: neck/incision  Management Techniques: Activity promotion;Body mechanics;Repositioning  COGNITION  Overall Cognitive Status:  WFL - within functional limits    RANGE OF MOTION AND STRENGTH ASSESSMENT  Upper extremity ROM and strength are within functional limits.  Lower extremity ROM is within functional limits.  Lower extremity strength is within functional limits.    BALANCE  Static Sitting: Good  Dynamic Sitting: Fair +  Static Standing: Fair  Dynamic Standing: Fair    AM-PAC '6-Clicks' INPATIENT SHORT FORM - BASIC MOBILITY  How much difficulty does the patient currently have...  Patient Difficulty: Turning over in bed (including adjusting bedclothes, sheets and blankets)?: A Little   Patient Difficulty: Sitting down on and standing up from a chair with arms (e.g., wheelchair, bedside commode, etc.): A Little   Patient Difficulty: Moving from lying on back to sitting on the side of the bed?: A Little   How much help from another person does the patient currently need...   Help from Another: Moving to and from a bed to a chair (including a wheelchair)?: A Little   Help from Another: Need to walk in hospital room?: A Little   Help from Another: Climbing 3-5 steps with a railing?: A Little     AM-PAC Score:  Raw Score: 18   Approx Degree of Impairment: 46.58%   Standardized Score (AM-PAC Scale): 43.63   CMS Modifier (G-Code): CK    FUNCTIONAL ABILITY STATUS  Functional Mobility/Gait Assessment  Gait Assistance: Supervision  Distance (ft): 150 x 2  Assistive Device: None  Pattern: Within Functional Limits  Stairs: Stairs  How Many Stairs: 4  Device: 1 Rail  Assist: Supervision  Pattern: Ascend and Descend  Ascend and Descend : Reciprocal  Supine to Sit: supervision  Sit to Stand: supervision    Exercise/Education Provided:  Bed mobility  Body mechanics  Energy conservation  Functional activity tolerated  Gait  training  Posture  Transfer training  Spine precautions  Log roll  Wearing Aspen collar at all times    RN approved PT eval. Pt received resting in bed. Introduced self and role. Education provided regarding cervical sx mobility protocol (see above) and PT plan of care. Pt verbalized understanding. Demos bed mobility, STS transfers to/from EOB and chair without assistive device, hallway ambulation without assistive device, and stair navigation with 1 HR to simulate home environment. All fxn mob completed with supervision only. No LOB or gait deviations noted. Returned to room and was left in the bathroom with needs/call light in reach, handoff to RN complete.     The patient's Approx Degree of Impairment: 46.58% has been calculated based on documentation in the [facility] '6 clicks' Inpatient Basic Mobility Short Form.  Research supports that patients with this level of impairment may benefit from home with HH PT however anticipate no needs.  Final disposition will be made by interdisciplinary medical team.    Patient End of Session: Needs met;Call light within reach;RN aware of session/findings;All patient questions and concerns addressed;In bathroom - nursing staff aware    Patient was able to achieve the following ...   Patient able to transfer   Safely and with supervision     Patient able to ambulate on level surfaces   Safely and with supervision     Patient Evaluation Complexity Level:  History Low - no personal factors and/or co-morbidities   Examination of body systems Low -  addressing 1-2 elements   Clinical Presentation Low- Stable   Clinical Decision Making  Low Complexity     Therapeutic Activity:  18 minutes

## 2024-04-11 NOTE — DISCHARGE SUMMARY
Discharge Summary     Damaris Ratliff Patient Status:  Outpatient in a Bed    1960 MRN [id number]   Location [facility] 4W/SW/SE Attending Deepika Hobbs MD   Hosp Day # 0 PCP CHELY VALENTINE     Date of Admission: 4/10/2024  Date of Discharge: 2024  Discharge Disposition: home    Discharge Diagnosis: s/p C5 corpectomy, C4-6 fusion     History of Present Illness:             Brief Synopsis:     1.       Cervical spinal stenosis with spondylolisthesis, status post anterior cervical complete corpectomy of C5; anterior cervical diskectomy; and arthrodesis of C4-5 and C5-6 adjacent to the corpectomy level; placement of anterior cervical corpectomy cage C5 level; and arthrodesis from C4 to C6 utilizing allograft and locally harvested morselized autograft anterior cervical plate and screw instrumentation at C4 and C6.    -no perioperative complications.  Patient discharged instable condition home with appropriate analgesia and follow up.  Wound care per nxsx  Collar on at all times except when in the shower.         2.       Essential hypertension.    Controlled  Continue home medications and monitor.    3.       Hypothyroidism.    Continue home medication.    4.       Hyperlipidemia.    Continue home medication.    Other chronic medicalissues:  Hodgkin lymphoma, status post radiation therapy, in remission; pemphigus vulgaris confined to mucous membranes; hypertension; hypothyroidism; anxiety; cervical spinal stenosis.   Three C-sections, bowel obstruction procedure, splenectomy, and today anterior cervical discectomy and fusion.       Lace+ Score: 19  59-90 High Risk  29-58 Medium Risk  0-28   Low Risk       TCM Follow-Up Recommendation:  LACE < 29: Low Risk of readmission after discharge from the hospital. No TCM follow-up needed.    Procedures during hospitalization:   s/p C5 corpectomy, C4-6 fusion           Consultants:  neurosurgery    Discharge Medication List:     Discharge  Medications        START taking these medications        Instructions Prescription details   HYDROcodone-acetaminophen 5-325 MG Tabs  Commonly known as: Norco      Take 1 tablet by mouth every 6 (six) hours as needed for Pain.   Stop taking on: April 17, 2024  Quantity: 28 tablet  Refills: 0     methocarbamol 500 MG Tabs  Commonly known as: Robaxin      Take 1 tablet (500 mg total) by mouth 3 (three) times daily for 10 days.   Stop taking on: April 20, 2024  Quantity: 30 tablet  Refills: 0     ondansetron 4 MG Tbdp  Commonly known as: Zofran-ODT      Take 1 tablet (4 mg total) by mouth every 4 (four) hours as needed for Nausea.   Quantity: 10 tablet  Refills: 0     traMADol 50 MG Tabs  Commonly known as: Ultram      Take 1 tablet (50 mg total) by mouth every 6 (six) hours as needed for Pain.   Stop taking on: April 18, 2024  Quantity: 28 tablet  Refills: 0            CONTINUE taking these medications        Instructions Prescription details   amLODIPine 10 MG Tabs  Commonly known as: Norvasc      Take 1 tablet (10 mg total) by mouth daily.   Refills: 0     atorvastatin 20 MG Tabs  Commonly known as: Lipitor      Take 0.5 tablets (10 mg total) by mouth daily.   Refills: 0     clonazePAM 1 MG Tabs  Commonly known as: KlonoPIN      Take 1 tablet (1 mg total) by mouth nightly.   Refills: 0     CO Q-10 OR      Take 1 tablet by mouth daily.   Refills: 0     Daily Vites Tabs      Take by mouth As Directed.   Refills: 0     Estradiol 10 MCG Tabs       Refills: 0     levothyroxine 75 MCG Tabs  Commonly known as: Synthroid       Refills: 0               Where to Get Your Medications        These medications were sent to [id number] - [place], [place] - [facility] [address] AT [facility] [address], [phone], [phone]  [address], [facility] [address]      Phone: [phone]   HYDROcodone-acetaminophen 5-325 MG Tabs  methocarbamol 500 MG Tabs  ondansetron 4 MG Tbdp  traMADol 50 MG Tabs         Follow-up  appointment:   Barrett Quinteros PA-C  [address] [facility] [address]  [facility] [id number] [phone]    Schedule an appointment as soon as possible for a visit in 2 week(s)  For wound re-check    [address] [place] [address] [phone]    Follow up      Appointments for Next 30 Days 4/11/2024 - 5/11/2024        Date Arrival Time Visit Type Length Department Provider     4/23/2024  1:00 PM  POST OP VISIT [6803] 30 min [facility], [facility], Alma Barrett Quinteros PA-C    Patient Instructions:         Location Instructions:     Masks are optional for all patients and visitors, unless otherwise indicated.                      Supplementary Documentation:   [facility] reviewed: na    Vital signs:  Temp:  [97 °F (36.1 °C)-98.1 °F (36.7 °C)] 97.9 °F (36.6 °C)  Pulse:  [67-83] 78  Resp:  [10-18] 18  BP: (106-128)/(63-85) 118/82  SpO2:  [95 %-99 %] 96 %    Physical Exam:    General:  NAD  Cardiovascular:  S1, S2  BCTA    -----------------------------------------------------------------------------------------------  PATIENT DISCHARGE INSTRUCTIONS: See electronic chart    Tip: Documentation requirements: For split shared discharge, BOTH providers need to document specific floor, unit, and time spent on the discharge.  The note needs to be signed by the provider with > 50% of time and bill under their NPI.   Time spent:  40 min         Deepika Hobbs MD

## 2024-04-11 NOTE — PROGRESS NOTES
AZALEA CUMMINGS M.D., F.A.A.N.S.     of Neurosurgery  [facility]  Board Certified Neurosurgeon                          [facility]  part of [facility] for [facility]      [address]  [address]  [place] [address]    PHONE  [phone]          FAX  [phone]    [email or web address]      NEUROSURGERY PROGRESS NOTE      Damaris Ratliff Patient Status:  Outpatient in a Bed    1960 MRN [id number]   Location [facility] 4W/SW/SE Attending Azalea Cummings MD   Hosp Day # 0 PCP CHELY VALENTINE     Subjective:  Damaris Ratliff is a(n) 63 year old female.  Alert, orientated x3.  Cooperative.  No apparent distress.  Eating well, only very mild dysphagia, no hoarseness    Vital Signs:    Temp:  [97 °F (36.1 °C)-98.1 °F (36.7 °C)] 97.9 °F (36.6 °C)  Pulse:  [67-83] 78  Resp:  [10-18] 18  BP: (106-128)/(63-85) 118/82  SpO2:  [95 %-99 %] 96 %    I/O:  I/O last 3 completed shifts:  In: 20 [IV PIGGYBACK:20]  Out: 20 [Drains:20]  Drain 15 over night    Inpatient Medications:    Current Facility-Administered Medications:     lactated ringers infusion, , Intravenous, Continuous    methocarbamol (Robaxin) tab 500 mg, 500 mg, Oral, TID PRN    sennosides (Senokot) tab 17.2 mg, 17.2 mg, Oral, Nightly    docusate sodium (Colace) cap 100 mg, 100 mg, Oral, BID    polyethylene glycol (PEG 3350) (Miralax) 17 g oral packet 17 g, 17 g, Oral, Daily PRN    magnesium hydroxide (Milk of Magnesia) 400 MG/5ML oral suspension 30 mL, 30 mL, Oral, Daily PRN    bisacodyl (Dulcolax) 10 MG rectal suppository 10 mg, 10 mg, Rectal, Daily PRN    fleet enema (Fleet) 7-19 GM/118ML rectal enema 133 mL, 1 enema, Rectal, Once PRN    ondansetron (Zofran) 4 MG/2ML injection 4 mg, 4 mg, Intravenous, Q6H PRN    prochlorperazine (Compazine) 10 MG/2ML injection 5 mg, 5 mg, Intravenous, Q8H PRN     diphenhydrAMINE (Benadryl) cap/tab 25 mg, 25 mg, Oral, Q4H PRN **OR** diphenhydrAMINE (Benadryl) 50 mg/mL  injection 25 mg, 25 mg, Intravenous, Q4H PRN    benzocaine-menthol (Cepacol) lozenge 1 lozenge, 1 lozenge, Buccal, Q15 Min PRN    oxyCODONE immediate release tab 2.5 mg, 2.5 mg, Oral, Q4H PRN **OR** oxyCODONE immediate release tab 5 mg, 5 mg, Oral, Q4H PRN    HYDROmorphone (Dilaudid) 1 MG/ML injection 0.2 mg, 0.2 mg, Intravenous, Q2H PRN **OR** HYDROmorphone (Dilaudid) 1 MG/ML injection 0.4 mg, 0.4 mg, Intravenous, Q2H PRN    amLODIPine (Norvasc) tab 10 mg, 10 mg, Oral, Daily    atorvastatin (Lipitor) tab 10 mg, 10 mg, Oral, Daily    clonazePAM (KlonoPIN) tab 1 mg, 1 mg, Oral, Nightly    levothyroxine (Synthroid) tab 75 mcg, 75 mcg, Oral, Daily @ 0700    traMADol (Ultram) tab 50 mg, 50 mg, Oral, Q6H PRN    traMADol (Ultram) tab 100 mg, 100 mg, Oral, Q6H PRN    acetaminophen (Tylenol) tab 650 mg, 650 mg, Oral, Q6H PRN    Labs:  Lab Results   Component Value Date    WBC 3.6 (L) 03/28/2024    HGB 12.5 03/28/2024    .0 03/28/2024    BUN 26 (H) 03/28/2024     03/28/2024    K 4.0 03/28/2024    CO2 25.0 03/28/2024    GLU 89 03/28/2024    ALB 4.4 03/28/2024    PTT 31.9 03/28/2024    INR 0.97 03/28/2024         Neurological Exam:  AAOx3, following commands  PERRLA  EOMI  MAEx4, 5/5 AMG    Sensation symmetrical    Incision c/d/i    Abdomen:  Soft, non-distended, non-tender, with no rebound or guarding.  No peritoneal signs.   Extremities:  Non-tender, no lower extremity edema noted.        Imaging:  XR FLUOROSCOPY C-ARM TIME LESS THAN 1 HOUR (CPT=76000)    Result Date: 4/10/2024  CONCLUSION: Fluoroscopic guidance as above.  93.2-seconds of fluoroscopy time were used.  A single (x1) fluoroscopic imaging as well as a 1 page-dose summary image are stored with this exam.  RADIATION DOSE (Dose Area Product):  0.43634-pIn*m^2.   Dictated by (CST): Jeancarlos Leyva MD on 4/10/2024 at 1:19 PM     Finalized by (CST):  Jeancarlos Leyva MD on 4/10/2024 at 1:20 PM           Assessment/Plan:  Principal Problem:    Cervical spinal stenosis  Active Problems:    S/P cervical spinal fusion    Essential hypertension    Hyperlipidemia    Hypothyroidism    POD#1 s/p C5 corpectomy, C4-6 fusion, doing well.   DC drain  DC Abx  Home today with collar on at all times except when in the shower.  Will prescribe Tramadol for pain.  Drain dressing can come off tomorrow.  D/w patient and nursing staff    All questions and concerns were addressed. We appreciate the opportunity to participate in the care of this patient. Please do not hesitate to call our office ([phone]) with any issues.          Emigdio Michael M.D., F.A.A.N.S.    4/11/2024  7:08 AM    This note has been dictated utilizing voice recognition software. Unfortunately, this may lead to occasional typographical errors. If there are any questions regarding this, please do not hesitate to contact our office.

## 2024-04-15 ENCOUNTER — TELEPHONE (OUTPATIENT)
Dept: SURGERY | Facility: CLINIC | Age: 64
End: 2024-04-15

## 2024-04-15 NOTE — TELEPHONE ENCOUNTER
Received fax from [facility]li stating [employer] has been denied coverage because the surgery was note three or more levels and she is not at risk for her bones not healing well.      Physician may call Eli at [phone] to discuss the case with a physician reviewer.  Appeal can be done within 180 days.      Request ID #[id number]    Please advise if provider will call [employer] to attempt overturning the denial.     Paperwork at RN desk.

## 2024-04-17 NOTE — TELEPHONE ENCOUNTER
Discussed with [employer] representative. Will discuss with patient and determine if it will be covered by insurance.

## 2024-04-22 RX ORDER — AZITHROMYCIN 250 MG/1
TABLET, FILM COATED ORAL
COMMUNITY
Start: 2024-03-26

## 2024-04-23 ENCOUNTER — OFFICE VISIT (OUTPATIENT)
Dept: SURGERY | Facility: CLINIC | Age: 64
End: 2024-04-23
Payer: COMMERCIAL

## 2024-04-23 VITALS
BODY MASS INDEX: 20.8 KG/M2 | WEIGHT: 113 LBS | SYSTOLIC BLOOD PRESSURE: 137 MMHG | DIASTOLIC BLOOD PRESSURE: 75 MMHG | HEIGHT: 62 IN | HEART RATE: 79 BPM

## 2024-04-23 DIAGNOSIS — Z98.1 S/P CERVICAL SPINAL FUSION: Primary | ICD-10-CM

## 2024-04-23 PROCEDURE — 3008F BODY MASS INDEX DOCD: CPT

## 2024-04-23 PROCEDURE — 3078F DIAST BP <80 MM HG: CPT

## 2024-04-23 PROCEDURE — 3075F SYST BP GE 130 - 139MM HG: CPT

## 2024-04-23 PROCEDURE — 99024 POSTOP FOLLOW-UP VISIT: CPT

## 2024-04-23 NOTE — PATIENT INSTRUCTIONS
Refill policies:    Allow 2-3 business days for refills; controlled substances may take longer.  Contact your pharmacy at least 5 days prior to running out of medication and have them send an electronic request or submit request through the “request refill” option in your [employer] account.  Refills are not addressed on weekends; covering physicians do not authorize routine medications on weekends.  No narcotics or controlled substances are refilled after noon on Fridays or by on call physicians.  By law, narcotics must be electronically prescribed.  A 30 day supply with no refills is the maximum allowed.  If your prescription is due for a refill, you may be due for a follow up appointment.  To best provide you care, patients receiving routine medications need to be seen at least once a year.  Patients receiving narcotic/controlled substance medications need to be seen at least once every 3 months.  In the event that your preferred pharmacy does not have the requested medication in stock (e.g. Backordered), it is your responsibility to find another pharmacy that has the requested medication available.  We will gladly send a new prescription to that pharmacy at your request.    Scheduling Tests:    If your physician has ordered radiology tests such as MRI or CT scans, please contact Central Scheduling at [phone] right away to schedule the test.  Once scheduled, the [facility] Centralized Referral Team will work with your insurance carrier to obtain pre-certification or prior authorization.  Depending on your insurance carrier, approval may take 3-10 days.  It is highly recommended patients assure they have received an authorization before having a test performed.  If test is done without insurance authorization, patient may be responsible for the entire amount billed.      Precertification and Prior Authorizations:  If your physician has recommended that you have a procedure or additional testing performed the [facility]  Centralized Referral Team will contact your insurance carrier to obtain pre-certification or prior authorization.    You are strongly encouraged to contact your insurance carrier to verify that your procedure/test has been approved and is a COVERED benefit.  Although the [facility] Centralized Referral Team does its due diligence, the insurance carrier gives the disclaimer that \"Although the procedure is authorized, this does not guarantee payment.\"    Ultimately the patient is responsible for payment.   Thank you for your understanding in this matter.  Paperwork Completion:  If you require FMLA or disability paperwork for your recovery, please make sure to either drop it off or have it faxed to our office at [phone]. Be sure the form has your name and date of birth on it.  The form will be faxed to our Forms Department and they will complete it for you.  There is a 25$ fee for all forms that need to be filled out.  Please be aware there is a 10-14 day turnaround time.  You will need to sign a release of information (LLOYD) form if your paperwork does not come with one.  You may call the Forms Department with any questions at [phone].  Their fax number is [phone].      To schedule Physical Therapy at any of the [facility], please call   [phone].     [facility] in [facility]  [address] [place] [address]            [facility] in [place]  [address] [place]  [address]    [facility] in [place]  [address] [place] [address]              [facility] in [place]  [address] [place] [address]    [facility] in [place]  [address] [place] [address]            **If you would prefer to do physical therapy at a different facility ([facility], [facility], etc.), please request an \"External\" physical therapy order from your provider     Complete Cervical X-rays in 4 weeks. Walk ins acceptable.    Follow up with Dr. Michael in 4 weeks.

## 2024-04-23 NOTE — PROGRESS NOTES
Patient is in to follow up - Post Op  Last office visit: 3/12/24  Last procedure: 4/10/24  Physical Therapy / Injections: Patient denies completing any recent Physical Therapy / Injections.  Numbness / Tingling: Patient denies numbness and tingling.   Pain Level: 1/10.

## 2024-04-23 NOTE — PROGRESS NOTES
AZALEA CUMMINGS M.D., F.A.A.N.S.     of Neurosurgery  [facility]  Board Certified Neurosurgeon                          [facility], [facility]        [address]  [address] [facility]  [place] [address]    PHONE  [phone]          FAX  [phone]    [email or web address]      OFFICE FOLLOW-UP NOTE      Damaris Ratliff    : 1960    MRN: [id number]  CSN: [id number]      PCP: CHELY VALENTINE  Referring Provider: No ref. provider found    Insurance: Payor: [facility] PPO / Plan: BCBS PPO / Product Type: PPO /     Date of Visit: 2024    Reason for Visit:   Chief Complaint    Post-Op                         History of Present Care:  Damaris Ratliff is a a(n) 63 year old, female.  She is 2 weeks status post cervical corpectomy and fusion.  She is doing very well and has not required anything more than a Tylenol for pain control.  She reports some postoperative tenderness in her neck and shoulders but denies any swallowing issues or upper extremity issues.  There is a subcutaneous hematoma at the incision but no incisional issues reported.      History:  .  Past Medical History:    Anxiety state    Back problem    Cancer (HCC)    Hodgkins lymphoma    Essential hypertension    Exposure to medical diagnostic radiation    High blood pressure    High cholesterol    Hyperthyroidism    Osteoarthritis      Past Surgical History:   Procedure Laterality Date          x 3    Other surgical history      bowel obstruction    Removal spleen, total      expl. lap    Spine surgery procedure unlisted  04/10/2024    Cervical 5 corpectomy and cervical 4-6 discectomy and fusion - Fernanda      Family History   Problem Relation Age of Onset    Cancer Father     No Known Problems Mother     Other (Other) Sister       Social History     Socioeconomic  History    Marital status:      Spouse name: Not on file    Number of children: Not on file    Years of education: Not on file    Highest education level: Not on file   Occupational History    Not on file   Tobacco Use    Smoking status: Never    Smokeless tobacco: Never   Vaping Use    Vaping status: Never Used   Substance and Sexual Activity    Alcohol use: Yes     Comment: 1 daily    Drug use: Never    Sexual activity: Not on file   Other Topics Concern    Not on file   Social History Narrative    Not on file     Social Determinants of Health     Financial Resource Strain: Not on file   Food Insecurity: No Food Insecurity (4/10/2024)    Food Insecurity     Food Insecurity: Never true   Transportation Needs: No Transportation Needs (4/10/2024)    Transportation Needs     Lack of Transportation: No   Physical Activity: Not on file   Stress: Not on file   Social Connections: Not on file   Housing Stability: Low Risk  (4/10/2024)    Housing Stability     Housing Instability: No     Housing Instability Emergency: Not on file        Allergies:  No Known Allergies      Medications:  Current Outpatient Medications   Medication Sig Dispense Refill    azithromycin 250 MG Oral Tab       ondansetron 4 MG Oral Tablet Dispersible Take 1 tablet (4 mg total) by mouth every 4 (four) hours as needed for Nausea. 10 tablet 0    amLODIPine 10 MG Oral Tab Take 1 tablet (10 mg total) by mouth daily.      Coenzyme Q10 (CO Q-10 OR) Take 1 tablet by mouth daily.      Multiple Vitamin (DAILY VITES) Oral Tab Take by mouth As Directed.      levothyroxine 75 MCG Oral Tab       Estradiol 10 MCG Vaginal Tab       clonazePAM 1 MG Oral Tab Take 1 tablet (1 mg total) by mouth nightly.      atorvastatin 20 MG Oral Tab Take 0.5 tablets (10 mg total) by mouth daily.          Review of Systems:  A 10-point system was reviewed.  Pertinent positives and negatives are noted in HPI.      Physical Exam:  /75   Pulse 79   Ht 62\"   Wt 113 lb  (51.3 kg)   BMI 20.67 kg/m²         Neurological Exam:    AAOx3, following commands  PERRLA  EOMI  Face symmetrical  Tongue midline  Hearing symmetrical and intact to finger rub    No rhinorrhea or otorrhea    Romberg negative    Motor Strength:  5 out of 5 throughout    Sensation to light touch:  Symmetrical in the upper extremities    Incision:  The incision is healing well with a well organized subcutaneous hematoma.    Abdomen:  Soft, non-distended, non-tender, with no rebound or guarding.  No peritoneal signs.     Extremities:  Non-tender, no lower extremity edema noted.      Labs:  CBC:  Lab Results   Component Value Date    WBC 3.6 (L) 03/28/2024    HGB 12.5 03/28/2024    HCT 37.3 03/28/2024    MCV 93.0 03/28/2024    .0 03/28/2024      BMG:  Lab Results   Component Value Date     03/28/2024    K 4.0 03/28/2024    CO2 25.0 03/28/2024     03/28/2024    BUN 26 (H) 03/28/2024      INR:  Lab Results   Component Value Date    INR 0.97 03/28/2024          Diagnostics:  No new imaging to review    Diagnosis:  1. S/P cervical spinal fusion      Assessment/Plan:  Patient is doing quite well at this point in time.  She is encouraged to start physical therapy, in the [place] collar for the first 2 weeks.  She subsequently may discontinue the use of the collar during PT but she is encouraged to continue wearing it when she is up and about.  The incision is healing well and I counseled her that this subcutaneous hematoma will resolve over time.  She may utilize some warm compresses if desired.  We are quite happy with the outcome thus far and we will see her back at the 6-week postop cuate at which point we will have her perform an x-ray of the cervical spine just prior to her follow-up.    More than 30 minutes were spent during this visit and the coordination of this patient's care. All questions and concerns were addressed. We appreciate the opportunity to participate in the care of this patient. Please  do not hesitate to call our office ([phone]) with any issues.        Emigdio Michael M.D., F.A.A.N.S.    4/23/2024  1:25 PM    This note has been dictated utilizing voice recognition software. Unfortunately, this may lead to occasional typographical errors. If there are any questions regarding this, please do not hesitate to contact our office.

## 2024-05-15 ENCOUNTER — TELEPHONE (OUTPATIENT)
Dept: SURGERY | Facility: CLINIC | Age: 64
End: 2024-05-15

## 2024-05-15 NOTE — TELEPHONE ENCOUNTER
Called back and spoke to DON Gupta.  Patient came in for an evaluation and decided it was too soon to start PT.  They are suggesting waiting until she is 8 weeks post op to start.  They would like to know what restrictions she will have with physical therapy.  They would like to work with retraction due to her protrusion.  Informed her that patient has a 6 week post op on 5-21-24 and Dr Michael will be able to advise on restrictions for PT after the evaluation.  We will [facility] to [facility] with this information after that appointment.   Patient reminder placed.       Fax [phone]

## 2024-05-15 NOTE — TELEPHONE ENCOUNTER
Rcvd call from Maria with [facility] requesting to speak with PA regarding pt's condition. Maria has requested a call back, please request to speak with Dana to further discuss. Ph [phone]

## 2024-05-21 ENCOUNTER — TELEPHONE (OUTPATIENT)
Dept: SURGERY | Facility: CLINIC | Age: 64
End: 2024-05-21

## 2024-05-21 ENCOUNTER — HOSPITAL ENCOUNTER (OUTPATIENT)
Dept: GENERAL RADIOLOGY | Facility: HOSPITAL | Age: 64
Discharge: HOME | End: 2024-05-21
Attending: NEUROLOGICAL SURGERY

## 2024-05-21 DIAGNOSIS — Z98.1 S/P CERVICAL SPINAL FUSION: Primary | ICD-10-CM

## 2024-05-21 DIAGNOSIS — Z98.1 S/P CERVICAL SPINAL FUSION: ICD-10-CM

## 2024-05-21 PROCEDURE — 72040 X-RAY EXAM NECK SPINE 2-3 VW: CPT | Performed by: NEUROLOGICAL SURGERY

## 2024-05-21 NOTE — TELEPHONE ENCOUNTER
Adding physical therapy and x-ray cervical spine order.  X-ray to be completed and 6 weeks prior to next follow-up visit.

## 2024-06-04 ENCOUNTER — TELEPHONE (OUTPATIENT)
Dept: SURGERY | Facility: CLINIC | Age: 64
End: 2024-06-04

## 2024-06-04 NOTE — TELEPHONE ENCOUNTER
Spoke to PT Mary.  She would like to know when the patient can start working on correcting her cervical protrusion.  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  Dr Michael did not document the discussion.  Patient's next apt is on 7-2-24.   Please advise.     Mary is aware Dr Michael is out of the office and will not return until 6-10, but it may be possible for his PA to advise on this.      Mary can be reached at [phone].

## 2024-06-04 NOTE — TELEPHONE ENCOUNTER
Mary calling to clarify PT order , has concerns that are not stipulated on order. Transferred call to nursing.

## 2024-06-04 NOTE — TELEPHONE ENCOUNTER
Attempted to call Yoanna at the number provided but it went to voicemail..    She may begin to initiate working on cervical range of motion and alignment of the cervical spine.

## 2024-07-01 NOTE — PATIENT INSTRUCTIONS
Refill policies:    Allow 2-3 business days for refills; controlled substances may take longer.  Contact your pharmacy at least 5 days prior to running out of medication and have them send an electronic request or submit request through the “request refill” option in your [employer] account.  Refills are not addressed on weekends; covering physicians do not authorize routine medications on weekends.  No narcotics or controlled substances are refilled after noon on Fridays or by on call physicians.  By law, narcotics must be electronically prescribed.  A 30 day supply with no refills is the maximum allowed.  If your prescription is due for a refill, you may be due for a follow up appointment.  To best provide you care, patients receiving routine medications need to be seen at least once a year.  Patients receiving narcotic/controlled substance medications need to be seen at least once every 3 months.  In the event that your preferred pharmacy does not have the requested medication in stock (e.g. Backordered), it is your responsibility to find another pharmacy that has the requested medication available.  We will gladly send a new prescription to that pharmacy at your request.    Scheduling Tests:    If your physician has ordered radiology tests such as MRI or CT scans, please contact Central Scheduling at [phone] right away to schedule the test.  Once scheduled, the [facility] Centralized Referral Team will work with your insurance carrier to obtain pre-certification or prior authorization.  Depending on your insurance carrier, approval may take 3-10 days.  It is highly recommended patients assure they have received an authorization before having a test performed.  If test is done without insurance authorization, patient may be responsible for the entire amount billed.      Precertification and Prior Authorizations:  If your physician has recommended that you have a procedure or additional testing performed the [facility]  Centralized Referral Team will contact your insurance carrier to obtain pre-certification or prior authorization.    You are strongly encouraged to contact your insurance carrier to verify that your procedure/test has been approved and is a COVERED benefit.  Although the [facility] Centralized Referral Team does its due diligence, the insurance carrier gives the disclaimer that \"Although the procedure is authorized, this does not guarantee payment.\"    Ultimately the patient is responsible for payment.   Thank you for your understanding in this matter.  Paperwork Completion:  If you require FMLA or disability paperwork for your recovery, please make sure to either drop it off or have it faxed to our office at [phone]. Be sure the form has your name and date of birth on it.  The form will be faxed to our Forms Department and they will complete it for you.  There is a 25$ fee for all forms that need to be filled out.  Please be aware there is a 10-14 day turnaround time.  You will need to sign a release of information (LLOYD) form if your paperwork does not come with one.  You may call the Forms Department with any questions at [phone].  Their fax number is [phone].       Follow up in 3 months with Cervical X-rays completed prior to this appointment visit.

## 2024-07-02 ENCOUNTER — OFFICE VISIT (OUTPATIENT)
Dept: SURGERY | Facility: CLINIC | Age: 64
End: 2024-07-02
Payer: COMMERCIAL

## 2024-07-02 ENCOUNTER — HOSPITAL ENCOUNTER (OUTPATIENT)
Dept: GENERAL RADIOLOGY | Facility: HOSPITAL | Age: 64
Discharge: HOME | End: 2024-07-02
Payer: COMMERCIAL

## 2024-07-02 VITALS
HEART RATE: 95 BPM | HEIGHT: 62 IN | WEIGHT: 110 LBS | DIASTOLIC BLOOD PRESSURE: 65 MMHG | SYSTOLIC BLOOD PRESSURE: 103 MMHG | BODY MASS INDEX: 20.24 KG/M2

## 2024-07-02 DIAGNOSIS — Z98.1 S/P CERVICAL SPINAL FUSION: Primary | ICD-10-CM

## 2024-07-02 DIAGNOSIS — Z98.1 S/P CERVICAL SPINAL FUSION: ICD-10-CM

## 2024-07-02 PROCEDURE — 72040 X-RAY EXAM NECK SPINE 2-3 VW: CPT

## 2024-07-02 PROCEDURE — 3078F DIAST BP <80 MM HG: CPT | Performed by: NEUROLOGICAL SURGERY

## 2024-07-02 PROCEDURE — 99214 OFFICE O/P EST MOD 30 MIN: CPT | Performed by: NEUROLOGICAL SURGERY

## 2024-07-02 PROCEDURE — 3008F BODY MASS INDEX DOCD: CPT | Performed by: NEUROLOGICAL SURGERY

## 2024-07-02 PROCEDURE — 3074F SYST BP LT 130 MM HG: CPT | Performed by: NEUROLOGICAL SURGERY

## 2024-07-02 NOTE — PROGRESS NOTES
Last procedure: 4/10/24  Last office visit: 4/23/24  Most recent imaging dated on: 7/2/24 - XR Cervical Spine  Pain Level: 7/10. Patient states that they are having pain and a tightness feeling located on their back of head, posterior neck and shoulders when walking.  Numbness / Tingling: Patient denies numbness and tingling.   Physical Therapy / Injections: Patient has been compliant with Physical Therapy.

## 2024-07-02 NOTE — PROGRESS NOTES
AZALEA CUMMINGS M.D., F.A.A.N.S.     of Neurosurgery  [facility]  Board Certified Neurosurgeon                          Formerly [facility], [facility], [facility] Neurosurgery        [address]  [address]  [place] [address]    PHONE  [phone]          FAX  [phone]    [email or web address]      OFFICE FOLLOW-UP NOTE      Damaris Ratliff    : 1960    MRN: [id number]  CSN: [id number]      PCP: CHELY VALENTINE  Referring Provider: No ref. provider found    Insurance: Payor: [facility] PPO / Plan: BCBS PPO / Product Type: PPO /     Date of Visit: 2024    Reason for Visit:   Chief Complaint    Post-Op; Follow - Up                         History of Present Care:  Damaris Ratliff is a a(n) 64 year old, female.  Our patient continues to have some axial discomfort, particularly when on the [employer] bike for prolonged period of time or when walking.  She is 3 months status post C5 corpectomy C4-6 fusion.  6 weeks ago she had an x-ray that showed some subsidence into the C6 vertebral body.  She is here for 3-month postop x-ray follow-up.  She denies any upper extremity symptoms.  There are no incisional issues.      History:  .  Past Medical History:    Anxiety state    Back problem    Cancer (HCC)    Hodgkins lymphoma    Essential hypertension    Exposure to medical diagnostic radiation    High blood pressure    High cholesterol    Hyperthyroidism    Osteoarthritis      Past Surgical History:   Procedure Laterality Date          x 3    Other surgical history      bowel obstruction    Removal spleen, total      expl. lap    Spine surgery procedure unlisted  04/10/2024    Cervical 5 corpectomy and cervical 4-6 discectomy and fusion - [place]      Family History   Problem Relation Age of Onset    Cancer Father     No Known Problems Mother     Other (Other)  Sister       Social History     Socioeconomic History    Marital status:      Spouse name: Not on file    Number of children: Not on file    Years of education: Not on file    Highest education level: Not on file   Occupational History    Not on file   Tobacco Use    Smoking status: Never    Smokeless tobacco: Never   Vaping Use    Vaping status: Never Used   Substance and Sexual Activity    Alcohol use: Yes     Comment: 1 daily    Drug use: Never    Sexual activity: Not on file   Other Topics Concern    Not on file   Social History Narrative    Not on file     Social Determinants of Health     Financial Resource Strain: Not on file   Food Insecurity: No Food Insecurity (4/10/2024)    Food Insecurity     Food Insecurity: Never true   Transportation Needs: No Transportation Needs (4/10/2024)    Transportation Needs     Lack of Transportation: No   Physical Activity: Not on file   Stress: Not on file   Social Connections: Not on file   Housing Stability: Low Risk  (4/10/2024)    Housing Stability     Housing Instability: No     Housing Instability Emergency: Not on file     Crib or Bassinette: Not on file        Allergies:  No Known Allergies      Medications:  Current Outpatient Medications   Medication Sig Dispense Refill    azithromycin 250 MG Oral Tab       ondansetron 4 MG Oral Tablet Dispersible Take 1 tablet (4 mg total) by mouth every 4 (four) hours as needed for Nausea. 10 tablet 0    amLODIPine 10 MG Oral Tab Take 1 tablet (10 mg total) by mouth daily.      Coenzyme Q10 (CO Q-10 OR) Take 1 tablet by mouth daily.      Multiple Vitamin (DAILY VITES) Oral Tab Take by mouth As Directed.      levothyroxine 75 MCG Oral Tab       Estradiol 10 MCG Vaginal Tab       clonazePAM 1 MG Oral Tab Take 1 tablet (1 mg total) by mouth nightly.      atorvastatin 20 MG Oral Tab Take 0.5 tablets (10 mg total) by mouth daily.          Review of Systems:  A 10-point system was reviewed.  Pertinent positives and negatives  are noted in HPI.      Physical Exam:  /65   Pulse 95   Ht 62\"   Wt 110 lb (49.9 kg)   BMI 20.12 kg/m²         Neurological Exam:    AAOx3, following commands  PERRLA  EOMI  Face symmetrical  Tongue midline  Hearing symmetrical and intact to finger rub    No rhinorrhea or otorrhea    Romberg negative    Motor Strength:  5 out of 5 throughout in all muscle groups.    Sensation to light touch:  Symmetrical and intact    Incision:  Clean dry and intact    Abdomen:  Soft, non-distended, non-tender, with no rebound or guarding.  No peritoneal signs.     Extremities:  Non-tender, no lower extremity edema noted.      Labs:  CBC:  Lab Results   Component Value Date    WBC 3.6 (L) 03/28/2024    HGB 12.5 03/28/2024    HCT 37.3 03/28/2024    MCV 93.0 03/28/2024    .0 03/28/2024      BMG:  Lab Results   Component Value Date     03/28/2024    K 4.0 03/28/2024    CO2 25.0 03/28/2024     03/28/2024    BUN 26 (H) 03/28/2024      INR:  Lab Results   Component Value Date    INR 0.97 03/28/2024          Diagnostics:  I reviewed the x-ray of the cervical spine with evidence of C5 corpectomy and C4-6 anterior instrumentation.  There is stable subsidence into the C6 vertebral body with the anterior part of the cage resting on the C6 screws.  There is no evidence of any changes from the last imaging study.    Diagnosis:  1. S/P cervical spinal fusion  - XR CERVICAL SPINE (2-3 VIEWS) (CPT=72040); Future      Assessment/Plan:  Our patient is stable with regard to her postoperative changes.  I have encouraged her to continue being active.  She may start utilizing nonsteroidal anti-inflammatories at this time.  She will try to utilize the collar when she is biking or when she is hiking to see if that changes her overall discomfort.  I am encouraged that there is no significant change in her postoperative imaging study at this point in time and we will repeat another x-ray at the 6-month postop cuate.    More than  30 minutes were spent during this visit and the coordination of this patient's care. All questions and concerns were addressed. We appreciate the opportunity to participate in the care of this patient. Please do not hesitate to call our office ([phone]) with any issues.        Emigdio Michael M.D., F.A.A.N.S.    7/2/2024  12:00 PM    This note has been dictated utilizing voice recognition software. Unfortunately, this may lead to occasional typographical errors. If there are any questions regarding this, please do not hesitate to contact our office.

## 2024-08-06 ENCOUNTER — TELEPHONE (OUTPATIENT)
Dept: SURGERY | Facility: CLINIC | Age: 64
End: 2024-08-06

## 2024-08-06 NOTE — TELEPHONE ENCOUNTER
Received email from denials Management stating CPT codes 18729 and 03234 were not included in the prior authorization for her surgery on 4-10-24 and they were both denied as not medically necessary.   Appeal needed.  Letter created and sent to denial team.

## 2024-10-14 RX ORDER — DIAPER,BRIEF,INFANT-TODD,DISP
1 EACH MISCELLANEOUS 2 TIMES DAILY
COMMUNITY
Start: 2024-06-13

## 2024-10-14 RX ORDER — OLMESARTAN MEDOXOMIL 20 MG/1
20 TABLET ORAL DAILY
COMMUNITY
Start: 2024-06-12

## 2024-10-14 NOTE — PATIENT INSTRUCTIONS
Refill policies:    Allow 2-3 business days for refills; controlled substances may take longer.  Contact your pharmacy at least 5 days prior to running out of medication and have them send an electronic request or submit request through the “request refill” option in your [employer] account.  Refills are not addressed on weekends; covering physicians do not authorize routine medications on weekends.  No narcotics or controlled substances are refilled after noon on Fridays or by on call physicians.  By law, narcotics must be electronically prescribed.  A 30 day supply with no refills is the maximum allowed.  If your prescription is due for a refill, you may be due for a follow up appointment.  To best provide you care, patients receiving routine medications need to be seen at least once a year.  Patients receiving narcotic/controlled substance medications need to be seen at least once every 3 months.  In the event that your preferred pharmacy does not have the requested medication in stock (e.g. Backordered), it is your responsibility to find another pharmacy that has the requested medication available.  We will gladly send a new prescription to that pharmacy at your request.    Scheduling Tests:    If your physician has ordered radiology tests such as MRI or CT scans, please contact Central Scheduling at [phone] right away to schedule the test.  Once scheduled, the [facility] Centralized Referral Team will work with your insurance carrier to obtain pre-certification or prior authorization.  Depending on your insurance carrier, approval may take 3-10 days.  It is highly recommended patients assure they have received an authorization before having a test performed.  If test is done without insurance authorization, patient may be responsible for the entire amount billed.      Precertification and Prior Authorizations:  If your physician has recommended that you have a procedure or additional testing performed the [facility]  Centralized Referral Team will contact your insurance carrier to obtain pre-certification or prior authorization.    You are strongly encouraged to contact your insurance carrier to verify that your procedure/test has been approved and is a COVERED benefit.  Although the [facility] Centralized Referral Team does its due diligence, the insurance carrier gives the disclaimer that \"Although the procedure is authorized, this does not guarantee payment.\"    Ultimately the patient is responsible for payment.   Thank you for your understanding in this matter.  Paperwork Completion:  If you require FMLA or disability paperwork for your recovery, please make sure to either drop it off or have it faxed to our office at [phone]. Be sure the form has your name and date of birth on it.  The form will be faxed to our Forms Department and they will complete it for you.  There is a 25$ fee for all forms that need to be filled out.  Please be aware there is a 10-14 day turnaround time.  You will need to sign a release of information (LLOYD) form if your paperwork does not come with one.  You may call the Forms Department with any questions at [phone].  Their fax number is [phone].

## 2024-10-15 ENCOUNTER — HOSPITAL ENCOUNTER (OUTPATIENT)
Dept: GENERAL RADIOLOGY | Facility: HOSPITAL | Age: 64
Discharge: HOME | End: 2024-10-15
Attending: NEUROLOGICAL SURGERY
Payer: COMMERCIAL

## 2024-10-15 ENCOUNTER — OFFICE VISIT (OUTPATIENT)
Dept: SURGERY | Facility: CLINIC | Age: 64
End: 2024-10-15
Payer: COMMERCIAL

## 2024-10-15 VITALS — WEIGHT: 110 LBS | BODY MASS INDEX: 20.24 KG/M2 | HEIGHT: 62 IN

## 2024-10-15 DIAGNOSIS — Z98.1 S/P CERVICAL SPINAL FUSION: Primary | ICD-10-CM

## 2024-10-15 DIAGNOSIS — Z98.1 S/P CERVICAL SPINAL FUSION: ICD-10-CM

## 2024-10-15 PROCEDURE — 99214 OFFICE O/P EST MOD 30 MIN: CPT | Performed by: NEUROLOGICAL SURGERY

## 2024-10-15 PROCEDURE — 3008F BODY MASS INDEX DOCD: CPT | Performed by: NEUROLOGICAL SURGERY

## 2024-10-15 PROCEDURE — 72040 X-RAY EXAM NECK SPINE 2-3 VW: CPT | Performed by: NEUROLOGICAL SURGERY

## 2024-10-15 RX ORDER — ESTRADIOL 2 MG/1
SYSTEM VAGINAL
COMMUNITY
Start: 2024-09-30

## 2024-10-15 RX ORDER — METRONIDAZOLE 250 MG/1
TABLET ORAL
COMMUNITY
Start: 2024-10-11

## 2024-10-15 RX ORDER — ZOLPIDEM TARTRATE 10 MG/1
10 TABLET ORAL NIGHTLY
COMMUNITY
Start: 2024-08-16

## 2024-10-15 RX ORDER — NIFEDIPINE 30 MG/1
TABLET, EXTENDED RELEASE ORAL
COMMUNITY
Start: 2024-09-27

## 2024-10-15 NOTE — PROGRESS NOTES
AZALEA CUMMINGS M.D., F.A.A.N.S.     of Neurosurgery  [facility]  Board Certified Neurosurgeon                          [facility], [facility], [facility]surgery        [address]  [address]  [place] [address]    PHONE  [phone]          FAX  [phone]    [email or web address]      OFFICE FOLLOW-UP NOTE      Damaris Ratliff    : 1960    MRN: [id number]  CSN: [id number]      PCP: CHELY VALENTINE  Referring Provider: No ref. provider found    Insurance: Payor: [facility] PPO / Plan: [facility] PPO / Product Type: PPO /     Date of Visit: 10/15/2024    Reason for Visit:   Chief Complaint    Post-Op                         History of Present Care:  Damaris Ratliff is a a(n) 64 year old, female.  Our patient is 6 months status post C5 corpectomy with C4-6 anterior cervical fusion.  Overall she is doing well but continues to have some occasional tightness and discomfort in the cervical spine, mostly associated with her forward posture.  She denies any significant upper extremity symptoms.      History:  .  Past Medical History:    Anxiety state    Back problem    Cancer (HCC)    Hodgkins lymphoma    Essential hypertension    Exposure to medical diagnostic radiation    High blood pressure    High cholesterol    Hyperthyroidism    Osteoarthritis      Past Surgical History:   Procedure Laterality Date          x 3    Other surgical history      bowel obstruction    Removal spleen, total      expl. lap    Spine surgery procedure unlisted  04/10/2024    Cervical 5 corpectomy and cervical 4-6 discectomy and fusion - [place]      Family History   Problem Relation Age of Onset    Cancer Father     No Known Problems Mother     Other (Other) Sister       Social History     Socioeconomic History    Marital status:      Spouse name: Not on file    Number  of children: Not on file    Years of education: Not on file    Highest education level: Not on file   Occupational History    Not on file   Tobacco Use    Smoking status: Never    Smokeless tobacco: Never   Vaping Use    Vaping status: Never Used   Substance and Sexual Activity    Alcohol use: Yes     Comment: 1 daily    Drug use: Never    Sexual activity: Not on file   Other Topics Concern    Not on file   Social History Narrative    Not on file     Social Drivers of Health     Financial Resource Strain: Not on file   Food Insecurity: No Food Insecurity (4/10/2024)    Food Insecurity     Food Insecurity: Never true   Transportation Needs: No Transportation Needs (4/10/2024)    Transportation Needs     Lack of Transportation: No   Physical Activity: Not on file   Stress: Not on file   Social Connections: Not on file   Housing Stability: Low Risk  (4/10/2024)    Housing Stability     Housing Instability: No     Housing Instability Emergency: Not on file     Crib or Bassinette: Not on file        Allergies:  Allergies[1]      Medications:  Current Outpatient Medications   Medication Sig Dispense Refill    metRONIDAZOLE 250 MG Oral Tab       NIFEdipine ER 30 MG Oral Tablet 24 Hr       zolpidem 10 MG Oral Tab Take 1 tablet (10 mg total) by mouth nightly.      ESTRING 7.5 MCG/24HR Vaginal Ring INSERT 1 RING VAGINALLY EVERY 90 DAYS      hydrocortisone 1 % External Ointment Apply 1 Application topically 2 (two) times daily. APPLY TO AFFECTED AREA      NIFEdipine ER 60 MG Oral Tablet 24 Hr Take 1 tablet (60 mg total) by mouth daily. ON AN EMPTY STOMACH      olmesartan 20 MG Oral Tab Take 1 tablet (20 mg total) by mouth daily.      azithromycin 250 MG Oral Tab       ondansetron 4 MG Oral Tablet Dispersible Take 1 tablet (4 mg total) by mouth every 4 (four) hours as needed for Nausea. 10 tablet 0    amLODIPine 10 MG Oral Tab Take 1 tablet (10 mg total) by mouth daily.      Coenzyme Q10 (CO Q-10 OR) Take 1 tablet by mouth  daily.      Multiple Vitamin (DAILY VITES) Oral Tab Take by mouth As Directed.      levothyroxine 75 MCG Oral Tab       Estradiol 10 MCG Vaginal Tab       clonazePAM 1 MG Oral Tab Take 1 tablet (1 mg total) by mouth nightly.      atorvastatin 20 MG Oral Tab Take 0.5 tablets (10 mg total) by mouth daily.          Review of Systems:  A 10-point system was reviewed.  Pertinent positives and negatives are noted in HPI.      Physical Exam:  Ht 62\"   Wt 110 lb (49.9 kg)   BMI 20.12 kg/m²         Neurological Exam:    AAOx3, following commands  PERRLA  EOMI  Face symmetrical  Tongue midline  Hearing symmetrical and intact to finger rub    No rhinorrhea or otorrhea    Romberg negative    Motor Strength:  Symmetrical in the upper extremities    Sensation to light touch:  Symmetrical and intact    Incision:  Clean dry and intact    Abdomen:  Soft, non-distended, non-tender, with no rebound or guarding.  No peritoneal signs.     Extremities:  Non-tender, no lower extremity edema noted.      Labs:  CBC:  Lab Results   Component Value Date    WBC 3.6 (L) 03/28/2024    HGB 12.5 03/28/2024    HCT 37.3 03/28/2024    MCV 93.0 03/28/2024    .0 03/28/2024      BMG:  Lab Results   Component Value Date     03/28/2024    K 4.0 03/28/2024    CO2 25.0 03/28/2024     03/28/2024    BUN 26 (H) 03/28/2024      INR:  Lab Results   Component Value Date    INR 0.97 03/28/2024          Diagnostics:  I reviewed the x-ray of the cervical spine with evidence of anterior cervical corpectomy with anterior plate and screw fixation from C4-C6.  The mild degree of subsidence into C6 is stable relative to the prior imaging study.  There is no evidence of complications.    Diagnosis:  1. S/P cervical spinal fusion      Assessment/Plan:  Our patient is encouraged to continue her activity level.  I discussed the option of chiropractic treatment as well as nutrition and exercise to keep her neck as strong as possible.  She does have quite  a thin neck and the underlying size of her neck might play a role in her symptomatology as well.  The x-rays satisfactory and I would like for her to follow-up at the 1 year postop cuate with another x-ray of the cervical spine.    More than 30 minutes were spent during this visit and the coordination of this patient's care. All questions and concerns were addressed. We appreciate the opportunity to participate in the care of this patient. Please do not hesitate to call our office ([phone]) with any issues.        Emigdio Michael M.D., F.A.A.N.S.    10/15/2024  1:48 PM    This note has been dictated utilizing voice recognition software. Unfortunately, this may lead to occasional typographical errors. If there are any questions regarding this, please do not hesitate to contact our office.        [1] No Known Allergies

## 2025-06-03 ENCOUNTER — HOSPITAL ENCOUNTER (OUTPATIENT)
Dept: GENERAL RADIOLOGY | Facility: HOSPITAL | Age: 65
Discharge: HOME | End: 2025-06-03
Attending: NEUROLOGICAL SURGERY
Payer: COMMERCIAL

## 2025-06-03 ENCOUNTER — OFFICE VISIT (OUTPATIENT)
Dept: SURGERY | Facility: CLINIC | Age: 65
End: 2025-06-03
Payer: COMMERCIAL

## 2025-06-03 VITALS
HEIGHT: 62 IN | WEIGHT: 122 LBS | HEART RATE: 71 BPM | DIASTOLIC BLOOD PRESSURE: 73 MMHG | OXYGEN SATURATION: 98 % | SYSTOLIC BLOOD PRESSURE: 114 MMHG | BODY MASS INDEX: 22.45 KG/M2

## 2025-06-03 DIAGNOSIS — Z98.1 S/P CERVICAL SPINAL FUSION: ICD-10-CM

## 2025-06-03 DIAGNOSIS — Z98.1 S/P CERVICAL SPINAL FUSION: Primary | ICD-10-CM

## 2025-06-03 PROCEDURE — 99214 OFFICE O/P EST MOD 30 MIN: CPT | Performed by: NEUROLOGICAL SURGERY

## 2025-06-03 PROCEDURE — 3074F SYST BP LT 130 MM HG: CPT | Performed by: NEUROLOGICAL SURGERY

## 2025-06-03 PROCEDURE — 72040 X-RAY EXAM NECK SPINE 2-3 VW: CPT | Performed by: NEUROLOGICAL SURGERY

## 2025-06-03 PROCEDURE — 3078F DIAST BP <80 MM HG: CPT | Performed by: NEUROLOGICAL SURGERY

## 2025-06-03 PROCEDURE — 3008F BODY MASS INDEX DOCD: CPT | Performed by: NEUROLOGICAL SURGERY

## 2025-06-03 RX ORDER — MELOXICAM 15 MG/1
1 TABLET ORAL
COMMUNITY
Start: 2024-11-15

## 2025-06-03 RX ORDER — OLMESARTAN MEDOXOMIL 40 MG/1
TABLET ORAL
COMMUNITY
Start: 2025-06-01

## 2025-06-03 RX ORDER — HYDROCORTISONE 25 MG/G
1 CREAM TOPICAL 2 TIMES DAILY
COMMUNITY
Start: 2024-12-27

## 2025-06-03 RX ORDER — POLYETHYLENE GLYCOL 3350, SODIUM CHLORIDE, SODIUM BICARBONATE, POTASSIUM CHLORIDE 420; 11.2; 5.72; 1.48 G/4L; G/4L; G/4L; G/4L
4000 POWDER, FOR SOLUTION ORAL
COMMUNITY
Start: 2025-04-09

## 2025-06-03 RX ORDER — METRONIDAZOLE 500 MG/1
1 TABLET ORAL
COMMUNITY
Start: 2024-11-15

## 2025-06-03 RX ORDER — NIFEDIPINE 30 MG
TABLET, EXTENDED RELEASE ORAL
COMMUNITY
Start: 2025-06-01

## 2025-06-03 NOTE — PROGRESS NOTES
Established patient presents to clinic for a 1 year and 1 month follow up from Cervical 5 corpectomy and cervical 4-6 discectomy and fusion surgery completed on 04/10/2024 by Dr. Michael. Patient states she is doing overall but she has posterior neck muscle discomfort and tightness. She rates her discomfort a 7/10.

## 2025-06-03 NOTE — PROGRESS NOTES
AZALEA CUMMINGS M.D., F.A.A.N.S.     of Neurosurgery  [facility]  Board Certified Neurosurgeon                          [facility], [facility], [facility] Neurosurgery        [address]  [address]  [place] [address]    PHONE  [phone]          FAX  [phone]    [email or web address]      OFFICE FOLLOW-UP NOTE      Damaris Ratliff    : 1960    MRN: [id number]  CSN: [id number]      PCP: CHELY VALENTINE  Referring Provider: No ref. provider found    Insurance: Payor: [facility] PPO / Plan: BCBS PPO / Product Type: PPO /     Date of Visit: 6/3/2025    Reason for Visit:   Chief Complaint    Follow - Up                         History of Present Care:  Damaris Ratliff is a a(n) 64 year old, female.  Our patient is approximately 1 year status post cervical corpectomy and fusion.  Overall, she is doing quite well.  She denies any limitation in ambulation.  Her upper and lower extremities are without symptoms.  She does continue to experience tightness in the posterior occiput and neck region that is occasionally affecting her from the activity that she wants to perform.  She has had a recent x-ray of the cervical spine.      History:  .  Past Medical History[1]   Past Surgical History[2]   Family History[3]   Social History     Socioeconomic History    Marital status:      Spouse name: Not on file    Number of children: Not on file    Years of education: Not on file    Highest education level: Not on file   Occupational History    Not on file   Tobacco Use    Smoking status: Never    Smokeless tobacco: Never   Vaping Use    Vaping status: Never Used   Substance and Sexual Activity    Alcohol use: Yes     Comment: 1 every other night    Drug use: Never    Sexual activity: Not on file   Other Topics Concern    Not on file   Social History Narrative     Not on file     Social Drivers of Health     Food Insecurity: No Food Insecurity (4/10/2024)    Food Insecurity     Food Insecurity: Never true   Transportation Needs: No Transportation Needs (4/10/2024)    Transportation Needs     Lack of Transportation: No   Stress: Not on file   Housing Stability: Low Risk  (4/10/2024)    Housing Stability     Housing Instability: No     Housing Instability Emergency: Not on file     Crib or Bassinette: Not on file        Allergies:  Allergies[4]      Medications:  Current Medications[5]     Review of Systems:  A 10-point system was reviewed.  Pertinent positives and negatives are noted in HPI.      Physical Exam:  /73 (BP Location: Left arm, Patient Position: Sitting, Cuff Size: adult)   Pulse 71   Ht 62\"   Wt 122 lb (55.3 kg)   SpO2 98%   BMI 22.31 kg/m²         Neurological Exam:    AAOx3, following commands  PERRLA  EOMI  Face symmetrical  Tongue midline  Hearing symmetrical and intact to finger rub    No rhinorrhea or otorrhea    Romberg negative    Motor Strength:  5 out of 5 throughout    Sensation to light touch:  Sensation symmetrical and intact to light touch    Incision:  Clean, dry and intact    Abdomen:  Soft, non-distended, non-tender, with no rebound or guarding.  No peritoneal signs.     Extremities:  Non-tender, no lower extremity edema noted.      Labs:  CBC:  Lab Results   Component Value Date    WBC 3.6 (L) 03/28/2024    HGB 12.5 03/28/2024    HCT 37.3 03/28/2024    MCV 93.0 03/28/2024    .0 03/28/2024      BMG:  Lab Results   Component Value Date     03/28/2024    K 4.0 03/28/2024    CO2 25.0 03/28/2024     03/28/2024    BUN 26 (H) 03/28/2024      INR:  Lab Results   Component Value Date    INR 0.97 03/28/2024          Diagnostics:  I reviewed the x-ray of the cervical spine with evidence of anterior cervical corpectomy and fusion.  The hardware is stable and there is no evidence of overt complication.    Diagnosis:  1. S/P  cervical spinal fusion      Assessment/Plan:  Our patient is doing appropriately well at this point in time.  I reviewed the x-rays with her and discussed the axial neck discomfort that is affecting her.  It is most likely postural in nature and I have encouraged her to continue her exercises and she will resume a regimen of physical therapy prior to embarking on a trip to [place] in August.  I also assured her that if her discomfort should worsen I would be happy to see her back and we could consider more imaging modalities at that time.    More than 30 minutes were spent during this visit and the coordination of this patient's care. All questions and concerns were addressed. We appreciate the opportunity to participate in the care of this patient. Please do not hesitate to call our office ([phone]) with any issues.        Emigdio Michael M.D., F.A.A.N.S.    6/3/2025  12:46 PM    This note has been dictated utilizing voice recognition software. Unfortunately, this may lead to occasional typographical errors. If there are any questions regarding this, please do not hesitate to contact our office.        [1]   Past Medical History:   Anxiety state    Back problem    cervical surgery c5 corpectomy    Cancer (HCC)    Hodgkins lymphoma    Disorder of thyroid    Essential hypertension    Exposure to medical diagnostic radiation    High blood pressure    High cholesterol    Osteoarthritis   [2]   Past Surgical History:  Procedure Laterality Date          x 3    Other surgical history      bowel obstruction    Removal spleen, total      expl. lap    Spine surgery procedure unlisted  04/10/2024    Cervical 5 corpectomy and cervical 4-6 discectomy and fusion - [facility]   [3]   Family History  Problem Relation Age of Onset    Cancer Father     No Known Problems Mother     Other (Other) Sister    [4] No Known Allergies  [5]   Current Outpatient Medications   Medication Sig Dispense Refill    Meloxicam 15 MG Oral Tab  Take 1 tablet (15 mg total) by mouth.      metroNIDAZOLE 500 MG Oral Tab Take 1 tablet (500 mg total) by mouth.      NIFEdipine ER 30 MG Oral Tablet 24 Hr       Olmesartan Medoxomil 40 MG Oral Tab       PEG 3350-KCl-Na Bicarb-NaCl 420 g Oral Recon Soln Take 4,000 mL by mouth.      hydrocortisone 2.5 % External Cream Apply 1 Application topically 2 (two) times daily. APPLY TO AFFECTED AREA      NIFEdipine ER 30 MG Oral Tablet 24 Hr Take 1 tablet (30 mg total) by mouth in the morning.      zolpidem 10 MG Oral Tab Take 1 tablet (10 mg total) by mouth nightly as needed for Sleep.      ESTRING 7.5 MCG/24HR Vaginal Ring       hydrocortisone 1 % External Ointment Apply 1 Application topically 2 (two) times daily as needed. APPLY TO AFFECTED AREA      olmesartan 20 MG Oral Tab Take 1 tablet (20 mg total) by mouth in the morning.      Coenzyme Q10 (CO Q-10 OR) Take 1 tablet by mouth at noon.      Multiple Vitamin (DAILY VITES) Oral Tab Take 1 tablet by mouth in the morning.      levothyroxine 75 MCG Oral Tab Take 1 tablet (75 mcg total) by mouth before breakfast.      clonazePAM 1 MG Oral Tab Take 1 tablet (1 mg total) by mouth nightly as needed (sleep).      atorvastatin 20 MG Oral Tab Take 0.5 tablets (10 mg total) by mouth in the morning.

## (undated) DEVICE — AEGIS 1" DISK 4MM HOLE, PEEL OPEN: Brand: MEDLINE

## (undated) DEVICE — SUT VCRL 3-0 18IN RB-1 ABSRB VLT L17MM 1/2 CI

## (undated) DEVICE — E-Z CLEAN, NON-STICK, PTFE COATED, ELECTROSURGICAL BLADE ELECTRODE, MODIFIED EXTENDED INSULATION, 4 INCH (10.2 CM): Brand: MEGADYNE

## (undated) DEVICE — APPLICATOR PREP 10.5ML ORNG CHG 2% ISO ALC

## (undated) DEVICE — DISPOSABLE SLIM BIPOLAR FORCEPS, NON-STICK,: Brand: SPETZLER-MALIS

## (undated) DEVICE — SET CERV CLLR AD UNIV COT LN PD HT ADJ DIAL

## (undated) DEVICE — SUT MCRYL 4-0 18IN PS-2 ABSRB UD 19MM 3/8 CIR

## (undated) DEVICE — 3M™ TEGADERM™ HP TRANSPARENT FILM DRESSING FRAME STYLE, 9534HP, 2-3/8 X 2-3/4 IN (6 CM X 7 CM), 100/CT 4CT/CASE: Brand: 3M™ TEGADERM™

## (undated) DEVICE — SCREW SPNL 3.5X17MM ANT CERV TI ALLY ST
Type: IMPLANTABLE DEVICE | Site: SPINE CERVICAL | Status: NON-FUNCTIONAL
Removed: 2024-04-10

## (undated) DEVICE — GLOVE SUR 7.5 SENSICARE PI MIC PIP CRM PWD F

## (undated) DEVICE — SNAP KOVER: Brand: UNBRANDED

## (undated) DEVICE — KIT HEMSTAT MTRX 8ML PORCINE GEL HUM THROM

## (undated) DEVICE — ADHESIVE SKIN TOP FOR WND CLSR DERMBND ADV

## (undated) DEVICE — BIT DRL 11MM TI ALLY FOR ACP SPNL PLT SYS

## (undated) DEVICE — ENCORE® LATEX MICRO SIZE 8, STERILE LATEX POWDER-FREE SURGICAL GLOVE: Brand: ENCORE

## (undated) DEVICE — 3M™ TEGADERM™ TRANSPARENT FILM DRESSING FRAME STYLE, 1626W, 4 IN X 4-3/4 IN (10 CM X 12 CM), 50/CT 4CT/CASE: Brand: 3M™ TEGADERM™

## (undated) DEVICE — SOLUTION IRRIG 1000ML 0.9% NACL USP BTL

## (undated) DEVICE — GLOVE SUR 8 SENSICARE PI MIC PIP CRM PWD F

## (undated) DEVICE — GAMMEX® PI HYBRID SIZE 8, STERILE POWDER-FREE SURGICAL GLOVE, POLYISOPRENE AND NEOPRENE BLEND: Brand: GAMMEX

## (undated) DEVICE — GLOVE SUR 8 SENSICARE NEOPR PWD F

## (undated) DEVICE — MAXCESS C MODULE

## (undated) DEVICE — CERVICAL CDS: Brand: MEDLINE INDUSTRIES, INC.

## (undated) DEVICE — INTENDED USE FOR SURGICAL MARKING ON INTACT SKIN, ALSO PROVIDES A PERMANENT METHOD OF IDENTIFYING OBJECTS IN THE OPERATING ROOM: Brand: WRITESITE® PLUS MINI PREP RESISTANT MARKER

## (undated) DEVICE — 3.0MM PRECISION NEURO (MATCH HEAD)

## (undated) DEVICE — KIT EVAC 400CC DIA1/8IN H PAT 12.5IN 3 SPR

## (undated) DEVICE — MONITORING NEUROPHYSIOLOGICAL

## (undated) DEVICE — PIN DISTR L12MM SPNL MAXCESS-C

## (undated) NOTE — LETTER
24    RE: Damaris Ratliff     : 1960    Dear Dr. Thomas,    This letter is to inform you that your patient has been scheduled for surgery with Dr. Michael on 4-10-24 at [facility]. Pre-operative clearance has been requested.  We have asked the patient to contact your office to schedule a pre-operative visit.     Diagnosis: cervical spondylolisthesis   Procedure: Cervical 5 Corpectomy and Cervical 4-6 Discectomy and Fusion      A Pre-operative History & Physical is needed for medical clearance within 30 days of surgery. Please address patient's active problems and potential risks of having surgery considering their medical history.  Pre-op labs are scheduled through the [facility] department. If any labs/testing are being done through the PCP office, then results should be faxed to the pre-admission testing department at [facility] at [phone]. Our pre-operative lab orders are located in our surgery order, if the patient would like these done through your office, you will need to place separate orders.     Please fax clearance letter/office visit note to our office at fax #: [phone]. Your office note must clearly indicate if the patient is medically cleared for surgery or not.    The following orders will be placed by pre-admission testing:  CBC  CMP  Type and Screen   PT/PTT/INR  MRSA/MSSA Nasal Swab  (*And any other pertinent testing based on patient's current clinical condition.)    If you have any questions, you may contact our office at [phone], option # 2.    Thank you,  Elma ANGLIN RN, BSN  Clinical Nurse Lead  [facility]

## (undated) NOTE — LETTER
AUTHORIZATION FOR SURGICAL OPERATION OR OTHER PROCEDURE    1. I hereby authorize  , and [facility] staff assigned to my case to perform the following operation and/or procedure at the [facility]:    _________________________________________Left sided tongue biopsy ______________________________________________________      _______________________________________________________________________________________________    2. My physician has explained the nature and purpose of the operation or other procedure, possible alternative methods of treatment, the risks involved, and the possibility of complication to me. I acknowledge that no guarantee has been made as to the result that may be obtained. 3.  I recognize that, during the course of this operation, or other procedure, unforseen conditions may necessitate additional or different procedure than those listed above. I, therefore, further authorize and request that the above named physician, his/her physician assistants or designees perform such procedures as are, in his/her professional opinion, necessary and desirable. 4.  Any tissue or organs removed in the operation or other procedure may be disposed of by and at the discretion of the [facility] and [facility]. 5.  I understand that in the event of a medical emergency, I will be transported by local paramedics to [facility] or other [facility] emergency department. 6.  I certify that I have read and fully understand the above consent to operation and/or other procedure. 7.  I acknowledge that my physician has explained sedation/analgesia administration to me including the risks and benefits. I consent to the administration of sedation/analgesia as may be necessary or desirable in the judgement of my physician.     Witness signature: ___________________________________________________ Date:  ______/______/_____                    Time:  ________ A.M.  P.M. Patient Name:  ______________________________________________________  (please print)      Patient signature:  ___________________________________________________             Relationship to Patient:           []  Parent    Responsible person                          []  Spouse  In case of minor or                    [] Other  _____________   Incompetent name:  __________________________________________________                               (please print)      _____________      Responsible person  In case of minor or  Incompetent signature:  _______________________________________________    Statement of Physician  My signature below affirms that prior to the time of the procedure, I have explained to the patient and/or his/her guardian, the risks and benefits involved in the proposed treatment and any reasonable alternative to the proposed treatment. I have also explained the risks and benefits involved in the refusal of the proposed treatment and have answered the patient's questions.                         Date:  ______/______/_______  Provider                      Signature:  __________________________________________________________       Time:  ___________ A.M    P.M.

## (undated) NOTE — LETTER
24    Patient: Damaris Ratliff  : 1960    Ref #[id number]     APPEAL FOR SURGERY CODES 92225 AND 10170 ALREADY RENDERED    To Whom it May Concern,  I am writing this letter to appeal the denial of CPT codes 84078 and 71279.  These services were rendered on 4-10-24 and were later denied as the CPT codes were not included in the original prior authorization that was submitted on 3-10-24.  Here is the prior authorization that was submitted:  Prior Authorization for Inpatient surgery initiated with [email or web address].  Requesting coverage for:Cervical 5 Corpectomy and Cervical 4-6 Discectomy and Fusion   Date of Service: 04/10/24-24               Inpatient days requested:2 Days  CPT codes: 54784, 69241, 07488, 02500, 73276     Request for surgery pending review, pending reference #[id number]. Clinical notes uploaded.    On 24, we received authorization for the codes that were submitted:  Prior authorization request completed for: Cervical 5 Corpectomy and Cervical 4-6 Discectomy and Fusion    Authorization #[id number]  Authorization dates: 04/10/24-24  CPT codes approved: 62717, 52396, 33651, 85353, 54519  Number of visits/dates of service approved: Outpatient   Physician: Fernanda   Location: [place]     The arthrodesis codes for the corpectomy were not submitted in the prior authorization.  As the corpectomy CPT 64649 was approved and cannot be performed without arthrodesis,  the decision by the insurance company to deny the CPT codes 42668 and 20824 is inappropriate and therefore we are appealing this denial.    Damaris Ratliff is a 63 year old who has been under my care since 3-12-24 for cervical radiculopathy.  I reviewed an MRI of the cervical spine taken on 3-4-24.  There is grade 2 spondylolisthesis at C4-5 with displacement of the cervical spinal cord.  There is severe stenosis at C4-5 and moderate narrowing at the lower levels, at C5-6 and C6-7.     I reviewed a CT of the cervical spine performed  on 3-12-24.  There is evidence of grade 2 spondylolisthesis at C4-5 with dysplastic elongation of the posterior lateral masses.  The lateral masses are dysplastic also at C6.    I reviewed flexion and extension x-rays of the cervical spine from 3-12-24 which showed evidence of mobile spondylolisthesis at C4-5 which appears to reduce on extension and certainly worsens on forward flexion.     As she presents with a symptomatic mobile spondylolisthesis, grade 2, at C4-5.  I reviewed the imaging modalities and discussed with the patient and her  that she would be a candidate for stabilization of the spondylolisthesis.  Reduction of the slippage will also lead to decompression of the spinal cord.  She is not objectively myelopathic at this point in time but certainly at a significant danger for spinal cord compression without addressing this pathology appropriately.  I do not believe that there is enough posterior element bone mass to obtain a good fixation posteriorly and my preference would be to address this issue anteriorly only.  The plan would be to attempt a C4-5 ACDF with the potential of performing a C5 corpectomy and C4-6 anterior fusion in case the reduction is not successful.        Here is the operative report from the surgery on 4-10-24:  \"OPERATIVE REPORT        PATIENT NAME: Damaris Ratliff     DATE OF OPERATION:  4/10/2024     PREOPERATIVE DIAGNOSIS:  1. Grade 2 C4-5 spondylolisthesis                                                             2. C5-6 spondylolisthesis     POSTOPERATIVE DIAGNOSIS: 1. same                                                               2. same     OPERATIVE PROCEDURE:  1.  Anterior cervical complete corpectomy of C5  2.  Anterior cervical discectomy and arthrodesis at C4-5 and C5-6 adjacent to the corpectomy level(s)   3.  Placement of anterior cervical corpectomy cage at C5 level and arthrodesis from C4 to C6 utilizing allograft and locally harvested morselized  autograft.  4.  Anterior cervical plate and screw instrumentation C4-6  5.  Real time intraoperative fluoroscopy and C-arm with the image guidance.  6.  Real time intraoperative motor-evoked potentials, SSEP and nerve monitoring.     CPT CODES: 36386, 25264, 69888, 78192, 41001-23, 72965, 58924, 43445-42     SURGEON:  Emigdio Michael M.D.     ASSISTANT: Kole Quinteros PA-C     ANESTHESIA: General endotracheal anesthesia with TIVA and local anesthetic.     EBL: 5 cc     INTRAVENOUS FLUIDS AND URINE OUTPUT: Per anesthesia sheet     TRANSFUSIONS: None     IMPLANTS: Nuva     DRAIN: medium HV     SPECIMEN: None     COMPLICATIONS: none     PROCEDURE:  After informed consent was obtained, the patient was brought to the operating room. The patient was then placed on the operating room table in the supine position.  After the uneventful induction of general endotracheal anesthesia, electrodes and monitoring devices were placed, when utilized. The shoulders were supported and the neck physiologically extended. All pressure points were adequately padded. Baseline electrophysiological potentials were obtained, when monitoring was being used. Subsequently, we utilized lateral fluoroscopic guidance to identify our incision site which was marked out on the anterior left side of the neck.  The patient was prepped and draped sterilely.  The C-arm was also draped sterilely into the field. Time-Out was done in the proper fashion. Perioperative antibiosis was given within one hour of incision.      After Time-Out, we infiltrated the incision with our usual local anesthetic. A skin crease incision was made in the anterior triangle of the neck.  The subcutaneous tissues were opened sharply and dissected off the platysma.  The platysma was then opened perpendicular to its fibers.  Using both sharp and blunt dissection, the trachea and esophagus were dissected medially and the carotid sheath structures dissected laterally to arrive at the  prevertebral space.  The C4-5 disc space was marked with a pre-bent spinal needle and this was confirmed with fluoroscopy images.  Using monopolar and bipolar coagulation, the longus colli muscles were then dissected off the corresponding vertebral bodies and the soft tissues were cleared off the anterior vertebral column. Self-retaining retractors were placed into the trough created by the dissection. These retractors were secured to a table-mounted arm for added stability. The cuff on the endotracheal tube was mildly deflated by anesthesia. At this point in time, the C4-5disc space was marked out and confirmed with fluoroscopic guidance.  Distraction pins were placed, utilizing fluoroscopic guidance in the C4and the C6 vertebral bodies and the segment was placed under light distraction.  We then opened the respective disc spaces with a disc knife and proceeded to perform a complete disc resection of the C4-5 and the C5-6 discs all the way to the posterior osteophytes.  Again, the cartilaginous endplates were removed using series of curettes, rongeurs, exposing the posterior annulus and the posterior longitudinal ligament.  We then proceeded with our corpectomy by utilizing a high-speed bur and drilling perpendicularly along the outer aspect of the [id number] body on either side, creating a midline bony keel.  This bone was harvested with a small Leksell rongeur and saved for the arthrodesis process utilizing the corpectomy cage later during the procedure.  In this fashion we completed a corpectomy involving close to 90% of the vertebral body.  Foraminotomies at the level above and below were performed bilaterally.  The thecal sac and neural elements were completely decompressed.  All bony edges were treated with bone wax to prevent any postoperative oozing.  At this point in time, we then measured for the proper sized cage which was assembled on the back table and filled with our locally harvested autograft,  mixed with Osteocel Pro, or allograft of choice. The expandable corpectomy cage was then placed and impacted under direct vision and utilizing fluoroscopic guidance. Once properly positioned, when we were satisfied, we released the distraction across the segment, removed the distraction pins and treated the pin holes with bone wax to prevent any post-operative oozing.  We then proceeded to expand the cage sequentially utilizing fluoroscopic guidance until we were happy with the restoration of the anterior tension band.  We secured the backing out mechanism to prevent collapse of the expanded cage and post packed it with more autograft.  Another series of motor-evoked potentials was run and this remained stable.      The properly-sized anterior cervical plate was chosen and found to fit the construct nicely.  This was placed over the vertebral column and gently contoured to fit the spine. A hand drill was used to prepare  holes in the corresponding vertebral bodies, C4 and C6, utilizing fluoroscopic guidance. We then placed the screws, attempting bicortical purchase, in the corresponding vertebral bodies until the locking mechanism of the plate was engaged. Fluoroscopy confirmed satisfactory position of the screws as well as the plate and cage. When utilizing intraoperative monitoring, another set of motor-evoked potentials was run and remained stable.  The self-retaining retractors were then removed and the wound was copiously irrigated.  Hand-held retractors were placed and small bleeding points were controlled with bipolar electrocautery.  More irrigation was used and hemostasis was confirmed meticulously and augmented with the use of Surgiflow or FlowSeal.      A medium hemovac drain was placed in the prevertebral space and brought out through a separate stab incision in the skin.  The drain was secured with a Bio-patch and covered with a Tegaderm.      The platysma was then closed using a series of  interrupted and running 3-0 Vicryl sutures. The subcutaneous tissues were copiously irrigated and closed with an interrupted inverted 4-0 Vicryl suture.  The skin was closed with Dermabond.       There were no complications.  All counts were reported correct. When utilized, motor-evoked potentials and the free-run EMG nerve monitoring remained stable throughout the entire procedure.  The patient was extubated in the operating room and taken to the recovery room in satisfactory condition, moving all four extremities strongly to command.\"    The following note was noted at her 2 week post op appointment on 4-23-24:  \"Patient is doing quite well at this point in time. She is encouraged to start physical therapy, in the Cornelia collar for the first 2 weeks. She subsequently may discontinue the use of the collar during PT but she is encouraged to continue wearing it when she is up and about. The incision is healing well and I counseled her that this subcutaneous hematoma will resolve over time. She may utilize some warm compresses if desired. We are quite happy with the outcome thus far and we will see her back at the 6-week postop cuate at which point we will have her perform an x-ray of the cervical spine just prior to her follow-up \"    At her 6 week post op appointment on 7-2-24, the following was noted:  \"Our patient is stable with regard to her postoperative changes. I have encouraged her to continue being active. She may start utilizing nonsteroidal anti-inflammatories at this time. She will try to utilize the collar when she is biking or when she is hiking to see if that changes her overall discomfort. I am encouraged that there is no significant change in her postoperative imaging study at this point in time and we will repeat another x-ray at the 6-month postop cuate. \"    The patient benefited from the surgery performed on 4-10-24, thus indicating that the CPT codes submitted after the surgery were medically  necessary.  As she continues in her recovery, the decision to deny payment for these codes is unacceptable as the surgery was successful in treating the patient's condition and therefore we submit this appeal for reconsideration of services already rendered.      If there is any more information that could assist in approval of CPT codes 57621 and 50066 in addition to the original authorization that was received for CPT codes 40750, 81483, 86344, 12205, 05583, please contact our office at [phone] or by fax at [phone].      Thank you,    Emigdio Michael M.D., F.A.A.N.S.   [facility]